# Patient Record
Sex: MALE | Race: WHITE | NOT HISPANIC OR LATINO | Employment: STUDENT | URBAN - NONMETROPOLITAN AREA
[De-identification: names, ages, dates, MRNs, and addresses within clinical notes are randomized per-mention and may not be internally consistent; named-entity substitution may affect disease eponyms.]

---

## 2017-02-06 ENCOUNTER — OFFICE VISIT (OUTPATIENT)
Dept: PSYCHIATRY | Facility: CLINIC | Age: 22
End: 2017-02-06

## 2017-02-06 VITALS
DIASTOLIC BLOOD PRESSURE: 80 MMHG | WEIGHT: 219 LBS | HEIGHT: 69 IN | BODY MASS INDEX: 32.44 KG/M2 | HEART RATE: 77 BPM | SYSTOLIC BLOOD PRESSURE: 131 MMHG

## 2017-02-06 DIAGNOSIS — F98.8 ATTENTION DEFICIT DISORDER OF ADULT: Primary | ICD-10-CM

## 2017-02-06 PROCEDURE — 99213 OFFICE O/P EST LOW 20 MIN: CPT | Performed by: NURSE PRACTITIONER

## 2017-02-06 RX ORDER — TRAZODONE HYDROCHLORIDE 50 MG/1
50 TABLET ORAL NIGHTLY
Qty: 30 TABLET | Refills: 0 | Status: SHIPPED | OUTPATIENT
Start: 2017-02-06 | End: 2017-04-03 | Stop reason: SDUPTHER

## 2017-02-06 NOTE — PROGRESS NOTES
Subjective   Ben Florez is a 21 y.o. male who is here today for medication management follow up at the [facility], he presented to his appointment about 30 minutes late related to traffic conditions.      Chief Complaint:  ADHD symptoms    History of Present Illness   He states that he is doing ok, not having any issues with the Concerta.  He denies any symptoms of depression or anxiety.  He feels like his ADHD symptoms are better under control.  He feels like he is sleeping better, he is getting about 8 hours per night with no NM. He continues to take the trazodone.  Appetite is good with no weight changes.  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  He denies any new health issues, he is ready for the spring and allergy season.   He denies any AV hallucinations, he denies any SI/HI.       The following portions of the patient's history were reviewed and updated as appropriate: allergies, current medications, past family history, past medical history, past social history, past surgical history and problem list.    Review of Systems   Constitutional: Negative for appetite change, chills, diaphoresis, fatigue, fever and unexpected weight change.   HENT: Negative for hearing loss, sore throat, trouble swallowing and voice change.    Eyes: Negative for photophobia and visual disturbance.   Respiratory: Negative for cough, chest tightness and shortness of breath.    Cardiovascular: Negative for chest pain and palpitations.   Gastrointestinal: Negative for abdominal pain, constipation, nausea and vomiting.   Endocrine: Negative for cold intolerance and heat intolerance.   Genitourinary: Negative for dysuria and frequency.   Musculoskeletal: Negative for arthralgias, back pain, joint swelling and neck stiffness.   Skin: Negative for color change and wound.   Allergic/Immunologic: Negative for environmental allergies and immunocompromised state.   Neurological: Negative for  "dizziness, tremors, seizures, syncope, weakness, light-headedness and headaches.   Hematological: Negative for adenopathy. Does not bruise/bleed easily.       Objective   Physical Exam   Constitutional: He appears well-developed and well-nourished. No distress.   Neurological: He is alert. Coordination and gait normal.   Vitals reviewed.    Blood pressure 131/80, pulse 77, height 69\" (175.3 cm), weight 219 lb (99.3 kg).    Allergies   Allergen Reactions   • Influenza Vaccine Live    • Meningococcal Vaccines        Current Medications:   Current Outpatient Prescriptions   Medication Sig Dispense Refill   • methylphenidate (CONCERTA) 36 MG CR tablet Take 2 tablets by mouth Daily. 60 tablet 0   • traZODone (DESYREL) 50 MG tablet Take 1 tablet by mouth Every Night. 30 tablet 0     No current facility-administered medications for this visit.        Mental Status Exam:   Hygiene:   good  Cooperation:  Cooperative  Eye Contact:  Fair  Psychomotor Behavior:  Appropriate  Affect:  Appropriate  Hopelessness: Denies  Speech:  Normal  Thought Process:  Goal directed  Thought Content:  Normal  Suicidal:  None  Homicidal:  None  Hallucinations:  None  Delusion:  None  Memory:  Intact  Orientation:  Person, Place, Time and Situation  Reliability:  fair  Insight:  Fair  Judgement:  Fair  Impulse Control:  Fair  Physical/Medical Issues:  No     Assessment/Plan     Attention deficit disorder of adult    Other orders  -     methylphenidate (CONCERTA) 36 MG CR tablet; Take 2 tablets by mouth Daily.   Trazodone 50mg tablet at bedtime    Discused medications options.  Continue concerta, begin trazodone to assist with sleep.   Discussed the risks, beneefits, and side effects of the medications; patient ackowledged and verbally consentedd.  Patient is aware to call the [facility] with any worsening of symptoms.  Patient is agreeable to call 911 or go to the nearest ER should he/she begin having SI/HI.      Return in 8 weeks  "

## 2017-03-20 RX ORDER — METHYLPHENIDATE HYDROCHLORIDE 36 MG/1
72 TABLET ORAL DAILY
Qty: 60 TABLET | Refills: 0 | Status: SHIPPED | OUTPATIENT
Start: 2017-03-20 | End: 2017-04-03 | Stop reason: SDUPTHER

## 2017-04-03 ENCOUNTER — OFFICE VISIT (OUTPATIENT)
Dept: PSYCHIATRY | Facility: CLINIC | Age: 22
End: 2017-04-03

## 2017-04-03 VITALS
DIASTOLIC BLOOD PRESSURE: 78 MMHG | BODY MASS INDEX: 33.47 KG/M2 | HEIGHT: 69 IN | SYSTOLIC BLOOD PRESSURE: 120 MMHG | HEART RATE: 75 BPM | WEIGHT: 226 LBS

## 2017-04-03 DIAGNOSIS — F98.8 ATTENTION DEFICIT DISORDER OF ADULT: Primary | ICD-10-CM

## 2017-04-03 PROCEDURE — 99213 OFFICE O/P EST LOW 20 MIN: CPT | Performed by: NURSE PRACTITIONER

## 2017-04-03 RX ORDER — METHYLPHENIDATE HYDROCHLORIDE 36 MG/1
72 TABLET ORAL DAILY
Qty: 60 TABLET | Refills: 0 | Status: SHIPPED | OUTPATIENT
Start: 2017-04-03 | End: 2017-06-06 | Stop reason: SDUPTHER

## 2017-04-03 RX ORDER — TRAZODONE HYDROCHLORIDE 50 MG/1
50 TABLET ORAL NIGHTLY
Qty: 30 TABLET | Refills: 2 | Status: SHIPPED | OUTPATIENT
Start: 2017-04-03 | End: 2017-06-26 | Stop reason: SDUPTHER

## 2017-04-03 NOTE — PROGRESS NOTES
Subjective   Ben Florez is a 21 y.o. male who is here today for medication management follow up at the [facility], he presented to his appointment on time, he presents with his father with whom he gives permission to speak in front of openly.     Chief Complaint:  ADHD symptoms    History of Present Illness  He states that he has been doing pretty good, he has had a lot of responsibilities and finishing up final projects before he graduates.  He is also looking for employment, hoping to work at the college.  He shares that his sleep has been disrupted but related that to school, he is getting about 4-6 hours per night- got about 5 hours last night. He is working on a [employer] for his last project which his [employer] down.  He states that he is not eating healthy but is eating on a regular basis.  He has been having allergy issues.  He and his girlfriend plan on getting  March 2018.  Denies any AV hallucinations, denies any SI/HI.  He states that he feels like the concerta and trazodone are helping, therefore no changes are made.     The following portions of the patient's history were reviewed and updated as appropriate: allergies, current medications, past family history, past medical history, past social history, past surgical history and problem list.    Review of Systems   Constitutional: Negative for appetite change, chills, diaphoresis, fatigue, fever and unexpected weight change.   HENT: Negative for hearing loss, sore throat, trouble swallowing and voice change.    Eyes: Negative for photophobia and visual disturbance.   Respiratory: Negative for cough, chest tightness and shortness of breath.    Cardiovascular: Negative for chest pain and palpitations.   Gastrointestinal: Negative for abdominal pain, constipation, nausea and vomiting.   Endocrine: Negative for cold intolerance and heat intolerance.   Genitourinary: Negative for dysuria and frequency.   Musculoskeletal: Negative for arthralgias,  "back pain, joint swelling and neck stiffness.   Skin: Negative for color change and wound.   Allergic/Immunologic: Negative for environmental allergies and immunocompromised state.   Neurological: Negative for dizziness, tremors, seizures, syncope, weakness, light-headedness and headaches.   Hematological: Negative for adenopathy. Does not bruise/bleed easily.       Objective   Physical Exam   Constitutional: He appears well-developed and well-nourished. No distress.   Neurological: He is alert. Coordination and gait normal.   Vitals reviewed.    Blood pressure 120/78, pulse 75, height 69\" (175.3 cm), weight 226 lb (103 kg).    Allergies   Allergen Reactions   • Influenza Vaccine Live    • Meningococcal Vaccines        Current Medications:   Current Outpatient Prescriptions   Medication Sig Dispense Refill   • methylphenidate (CONCERTA) 36 MG CR tablet Take 2 tablets by mouth Daily. 60 tablet 0   • traZODone (DESYREL) 50 MG tablet Take 1 tablet by mouth Every Night. 30 tablet 0     No current facility-administered medications for this visit.        Mental Status Exam:   Hygiene:   good  Cooperation:  Cooperative  Eye Contact:  Fair  Psychomotor Behavior:  Appropriate  Affect:  Appropriate  Hopelessness: Denies  Speech:  Normal  Thought Process:  Goal directed  Thought Content:  Normal  Suicidal:  None  Homicidal:  None  Hallucinations:  None  Delusion:  None  Memory:  Intact  Orientation:  Person, Place, Time and Situation  Reliability:  fair  Insight:  Fair  Judgement:  Fair  Impulse Control:  Fair  Physical/Medical Issues:  No     Assessment/Plan     Attention deficit disorder of adult    Other orders  -     methylphenidate (CONCERTA) 36 MG CR tablet; Take 2 tablets by mouth Daily.   Trazodone 50mg tablet at bedtime    Discused medications options.  Continue concerta, begin trazodone to assist with sleep.   Discussed the risks, beneefits, and side effects of the medications; patient ackowledged and verbally " consentedd.  Patient is aware to call the [facility] with any worsening of symptoms.  Patient is agreeable to call 911 or go to the nearest ER should he/she begin having SI/HI.    He may have 2 refills before his next appointment related to stability.        Return in 12 weeks

## 2017-06-05 ENCOUNTER — TELEPHONE (OUTPATIENT)
Dept: PSYCHIATRY | Facility: CLINIC | Age: 22
End: 2017-06-05

## 2017-06-06 RX ORDER — METHYLPHENIDATE HYDROCHLORIDE 36 MG/1
72 TABLET ORAL DAILY
Qty: 60 TABLET | Refills: 0 | Status: SHIPPED | OUTPATIENT
Start: 2017-06-06 | End: 2017-06-26 | Stop reason: SDUPTHER

## 2017-06-26 ENCOUNTER — OFFICE VISIT (OUTPATIENT)
Dept: PSYCHIATRY | Facility: CLINIC | Age: 22
End: 2017-06-26

## 2017-06-26 VITALS
SYSTOLIC BLOOD PRESSURE: 128 MMHG | HEART RATE: 80 BPM | WEIGHT: 228 LBS | BODY MASS INDEX: 33.77 KG/M2 | DIASTOLIC BLOOD PRESSURE: 79 MMHG | HEIGHT: 69 IN

## 2017-06-26 DIAGNOSIS — F90.2 ATTENTION DEFICIT HYPERACTIVITY DISORDER, COMBINED TYPE: Primary | ICD-10-CM

## 2017-06-26 PROCEDURE — 99213 OFFICE O/P EST LOW 20 MIN: CPT | Performed by: NURSE PRACTITIONER

## 2017-06-26 RX ORDER — METHYLPHENIDATE HYDROCHLORIDE 36 MG/1
72 TABLET ORAL DAILY
Qty: 60 TABLET | Refills: 0 | Status: SHIPPED | OUTPATIENT
Start: 2017-06-26 | End: 2017-08-09 | Stop reason: SDUPTHER

## 2017-06-26 RX ORDER — TRAZODONE HYDROCHLORIDE 50 MG/1
50 TABLET ORAL NIGHTLY
Qty: 30 TABLET | Refills: 2 | Status: SHIPPED | OUTPATIENT
Start: 2017-06-26 | End: 2017-09-19 | Stop reason: ALTCHOICE

## 2017-06-26 NOTE — PROGRESS NOTES
Subjective   Ben Florez is a 22 y.o. male who is here today for medication management follow up at the [facility], he presented to his appointment on time, he presents with his father with whom he gives permission to speak in front of openly.     Chief Complaint:  ADHD symptoms    History of Present Illness  He states that he has been doing all right but is currently looking for employment in graphic [employer].  He states that he is interested in [employer] but not openly available, he states that he would like to eventually work in [place] and live in [place].  He is looking into different area of art, consider [employer] to further his possible work.  He states that his girlfriend is under control of her mother, she is very protective although she is 21 years old and engaged.  He states that he continues to stay up late, getting about 6 hours per night with no NM.  Denies any new medical issues.  Denies any aV hallucinations, denies any SI/HI.   Continue current medications.        The following portions of the patient's history were reviewed and updated as appropriate: allergies, current medications, past family history, past medical history, past social history, past surgical history and problem list.    Review of Systems   Constitutional: Negative for appetite change, chills, diaphoresis, fatigue, fever and unexpected weight change.   HENT: Negative for hearing loss, sore throat, trouble swallowing and voice change.    Eyes: Negative for photophobia and visual disturbance.   Respiratory: Negative for cough, chest tightness and shortness of breath.    Cardiovascular: Negative for chest pain and palpitations.   Gastrointestinal: Negative for abdominal pain, constipation, nausea and vomiting.   Endocrine: Negative for cold intolerance and heat intolerance.   Genitourinary: Negative for dysuria and frequency.   Musculoskeletal: Negative for arthralgias, back pain, joint swelling and neck stiffness.   Skin:  "Negative for color change and wound.   Allergic/Immunologic: Negative for environmental allergies and immunocompromised state.   Neurological: Negative for dizziness, tremors, seizures, syncope, weakness, light-headedness and headaches.   Hematological: Negative for adenopathy. Does not bruise/bleed easily.       Objective   Physical Exam   Constitutional: He appears well-developed and well-nourished. No distress.   Neurological: He is alert. Coordination and gait normal.   Vitals reviewed.    Blood pressure 128/79, pulse 80, height 69\" (175.3 cm), weight 228 lb (103 kg).    Allergies   Allergen Reactions   • Influenza Vaccine Live    • Meningococcal Vaccines        Current Medications:   Current Outpatient Prescriptions   Medication Sig Dispense Refill   • methylphenidate (CONCERTA) 36 MG CR tablet Take 2 tablets by mouth Daily. 60 tablet 0   • traZODone (DESYREL) 50 MG tablet Take 1 tablet by mouth Every Night. 30 tablet 2     No current facility-administered medications for this visit.        Mental Status Exam:   Hygiene:   good  Cooperation:  Cooperative  Eye Contact:  Fair  Psychomotor Behavior:  Appropriate  Affect:  Appropriate  Hopelessness: Denies  Speech:  Normal  Thought Process:  Goal directed  Thought Content:  Normal  Suicidal:  None  Homicidal:  None  Hallucinations:  None  Delusion:  None  Memory:  Intact  Orientation:  Person, Place, Time and Situation  Reliability:  fair  Insight:  Fair  Judgement:  Fair  Impulse Control:  Fair  Physical/Medical Issues:  No     Assessment/Plan     Attention deficit disorder of adult    Other orders  -     methylphenidate (CONCERTA) 36 MG CR tablet; Take 2 tablets by mouth Daily.   Trazodone 50mg tablet at bedtime    Discused medications options.  Continue concerta, begin trazodone to assist with sleep.   Discussed the risks, beneefits, and side effects of the medications; patient ackowledged and verbally consentedd.  Patient is aware to call the [facility] " with any worsening of symptoms.  Patient is agreeable to call 911 or go to the nearest ER should he/she begin having SI/HI.    He may have 2 refills before his next appointment related to stability.        Return in 12 weeks

## 2017-08-09 ENCOUNTER — TELEPHONE (OUTPATIENT)
Dept: PSYCHIATRY | Facility: CLINIC | Age: 22
End: 2017-08-09

## 2017-08-09 RX ORDER — METHYLPHENIDATE HYDROCHLORIDE 36 MG/1
72 TABLET ORAL DAILY
Qty: 60 TABLET | Refills: 0 | Status: SHIPPED | OUTPATIENT
Start: 2017-08-09 | End: 2017-08-14 | Stop reason: SDUPTHER

## 2017-08-14 RX ORDER — METHYLPHENIDATE HYDROCHLORIDE 36 MG/1
72 TABLET ORAL DAILY
Qty: 60 TABLET | Refills: 0 | Status: SHIPPED | OUTPATIENT
Start: 2017-08-14 | End: 2017-09-19 | Stop reason: SDUPTHER

## 2017-09-19 ENCOUNTER — OFFICE VISIT (OUTPATIENT)
Dept: PSYCHIATRY | Facility: CLINIC | Age: 22
End: 2017-09-19

## 2017-09-19 VITALS
BODY MASS INDEX: 34.6 KG/M2 | SYSTOLIC BLOOD PRESSURE: 138 MMHG | WEIGHT: 233.6 LBS | DIASTOLIC BLOOD PRESSURE: 84 MMHG | HEART RATE: 80 BPM | HEIGHT: 69 IN

## 2017-09-19 DIAGNOSIS — F90.2 ATTENTION DEFICIT HYPERACTIVITY DISORDER, COMBINED TYPE: Primary | ICD-10-CM

## 2017-09-19 PROCEDURE — 99213 OFFICE O/P EST LOW 20 MIN: CPT | Performed by: NURSE PRACTITIONER

## 2017-09-19 RX ORDER — METHYLPHENIDATE HYDROCHLORIDE 36 MG/1
72 TABLET ORAL DAILY
Qty: 60 TABLET | Refills: 0 | Status: SHIPPED | OUTPATIENT
Start: 2017-09-19 | End: 2017-10-18 | Stop reason: SDUPTHER

## 2017-09-19 NOTE — PROGRESS NOTES
Subjective   Ben Florez is a 22 y.o. male who is here today for medication management follow up at the [facility], he presented to his appointment on time, he presents with his father with whom he gives permission to speak in front of openly.     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  He states that he is doing well with his medications, especially with work and staying focused.  He states that he forgot his medication once and he found it horrible.  Denies any SE or problems.   Denies any symptoms of depression, when he is not taking his medications he feels anxious.  He has avoided taking the trazodone, he is now on a schedule and doesn't normally take it.  He is getting about 7 hours of sleep per night with no NM- however he wakes up about 30 minutes before alarm clock goes off.  He states that he has been eating ok, eating more at night with only slight weight gain.  He is recommended to begin going to the gym to maintain weight; he denies any new health issues.  He states that he has not real stressors other than work, has put off wedding for another time- waiting to get more financial stability.  Denies any AV hallucinations, denies any SI/HI.      The following portions of the patient's history were reviewed and updated as appropriate: allergies, current medications, past family history, past medical history, past social history, past surgical history and problem list.    Review of Systems   Constitutional: Negative for appetite change, chills, diaphoresis, fatigue, fever and unexpected weight change.   HENT: Negative for hearing loss, sore throat, trouble swallowing and voice change.    Eyes: Negative for photophobia and visual disturbance.   Respiratory: Negative for cough, chest tightness and shortness of breath.    Cardiovascular: Negative for chest pain and palpitations.   Gastrointestinal:  "Negative for abdominal pain, constipation, nausea and vomiting.   Endocrine: Negative for cold intolerance and heat intolerance.   Genitourinary: Negative for dysuria and frequency.   Musculoskeletal: Negative for arthralgias, back pain, joint swelling and neck stiffness.   Skin: Negative for color change and wound.   Allergic/Immunologic: Negative for environmental allergies and immunocompromised state.   Neurological: Negative for dizziness, tremors, seizures, syncope, weakness, light-headedness and headaches.   Hematological: Negative for adenopathy. Does not bruise/bleed easily.       Objective   Physical Exam   Constitutional: He appears well-developed and well-nourished. No distress.   Neurological: He is alert. Coordination and gait normal.   Vitals reviewed.    Blood pressure 138/84, pulse 80, height 69\" (175.3 cm), weight 233 lb 9.6 oz (106 kg).    Allergies   Allergen Reactions   • Influenza Vaccine Live    • Meningococcal Vaccines        Current Medications:   Current Outpatient Prescriptions   Medication Sig Dispense Refill   • methylphenidate (CONCERTA) 36 MG CR tablet Take 2 tablets by mouth Daily. 60 tablet 0   • traZODone (DESYREL) 50 MG tablet Take 1 tablet by mouth Every Night. 30 tablet 2     No current facility-administered medications for this visit.        Mental Status Exam:   Hygiene:   good  Cooperation:  Cooperative  Eye Contact:  Fair  Psychomotor Behavior:  Appropriate  Affect:  Appropriate  Hopelessness: Denies  Speech:  Normal  Thought Process:  Goal directed  Thought Content:  Normal  Suicidal:  None  Homicidal:  None  Hallucinations:  None  Delusion:  None  Memory:  Intact  Orientation:  Person, Place, Time and Situation  Reliability:  fair  Insight:  Fair  Judgement:  Fair  Impulse Control:  Fair  Physical/Medical Issues:  No     Assessment/Plan     Attention deficit disorder of adult    Other orders  -     methylphenidate (CONCERTA) 36 MG CR tablet; Take 2 tablets by mouth " Daily.       Discused medications options.  Continue concerta for ADHD symptoms, discontinue trazodone (no longer needed)    Discussed the risks, beneefits, and side effects of the medications; patient ackowledged and verbally consentedd.  Patient is aware to call the [facility] with any worsening of symptoms.  Patient is agreeable to call 911 or go to the nearest ER should he/she begin having SI/HI.    He may have 2 refills before his next appointment related to stability.        Return in 12 weeks

## 2017-10-18 RX ORDER — METHYLPHENIDATE HYDROCHLORIDE 36 MG/1
72 TABLET ORAL DAILY
Qty: 60 TABLET | Refills: 0 | Status: SHIPPED | OUTPATIENT
Start: 2017-10-18 | End: 2017-11-13 | Stop reason: SDUPTHER

## 2017-11-13 RX ORDER — METHYLPHENIDATE HYDROCHLORIDE 36 MG/1
72 TABLET ORAL DAILY
Qty: 60 TABLET | Refills: 0 | Status: SHIPPED | OUTPATIENT
Start: 2017-11-13 | End: 2017-12-12 | Stop reason: SDUPTHER

## 2017-11-13 RX ORDER — METHYLPHENIDATE HYDROCHLORIDE 36 MG/1
72 TABLET ORAL DAILY
Qty: 60 TABLET | Refills: 0 | Status: SHIPPED | OUTPATIENT
Start: 2017-11-13 | End: 2017-11-13 | Stop reason: SDUPTHER

## 2017-12-12 ENCOUNTER — OFFICE VISIT (OUTPATIENT)
Dept: PSYCHIATRY | Facility: CLINIC | Age: 22
End: 2017-12-12

## 2017-12-12 VITALS
WEIGHT: 246 LBS | BODY MASS INDEX: 35.22 KG/M2 | HEIGHT: 70 IN | SYSTOLIC BLOOD PRESSURE: 130 MMHG | DIASTOLIC BLOOD PRESSURE: 84 MMHG | HEART RATE: 83 BPM

## 2017-12-12 DIAGNOSIS — F90.2 ATTENTION DEFICIT HYPERACTIVITY DISORDER, COMBINED TYPE: Primary | ICD-10-CM

## 2017-12-12 PROCEDURE — 99213 OFFICE O/P EST LOW 20 MIN: CPT | Performed by: NURSE PRACTITIONER

## 2017-12-12 RX ORDER — METHYLPHENIDATE HYDROCHLORIDE 36 MG/1
72 TABLET ORAL DAILY
Qty: 60 TABLET | Refills: 0 | Status: SHIPPED | OUTPATIENT
Start: 2017-12-12 | End: 2017-12-12 | Stop reason: SDUPTHER

## 2017-12-12 RX ORDER — METHYLPHENIDATE HYDROCHLORIDE 36 MG/1
72 TABLET ORAL DAILY
Qty: 60 TABLET | Refills: 0 | Status: SHIPPED | OUTPATIENT
Start: 2017-12-12 | End: 2018-03-06 | Stop reason: SDUPTHER

## 2017-12-12 NOTE — PROGRESS NOTES
Subjective   Ben Florez is a 22 y.o. male who is here today for medication management follow up at the [facility], he presented to his appointment on time, he presents with his father with whom he gives permission to speak in front of openly.     Chief Complaint:  ADHD symptoms    History of Present Illness  He states that he is doing ok overall but not feeling well physically with upper respiratory infection.  He states that he is still doing well with the concerta with no SE from the medications.  He states that he is doing well with is sleep, he has gotten into a schedule especially with work.  He is getting at least 6-8 hours per night with no NM.  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  He is only stressed out about work, he is getting  in the summer.  Denies any AV hallucinations, denies any SI/HI.      The following portions of the patient's history were reviewed and updated as appropriate: allergies, current medications, past family history, past medical history, past social history, past surgical history and problem list.    Review of Systems   Constitutional: Negative for appetite change, chills, diaphoresis, fatigue, fever and unexpected weight change.   HENT: Negative for hearing loss, sore throat, trouble swallowing and voice change.    Eyes: Negative for photophobia and visual disturbance.   Respiratory: Negative for cough, chest tightness and shortness of breath.    Cardiovascular: Negative for chest pain and palpitations.   Gastrointestinal: Negative for abdominal pain, constipation, nausea and vomiting.   Endocrine: Negative for cold intolerance and heat intolerance.   Genitourinary: Negative for dysuria and frequency.   Musculoskeletal: Negative for arthralgias, back pain, joint swelling and neck stiffness.   Skin: Negative for color change and wound.   Allergic/Immunologic: Negative for environmental allergies and immunocompromised state.  "  Neurological: Negative for dizziness, tremors, seizures, syncope, weakness, light-headedness and headaches.   Hematological: Negative for adenopathy. Does not bruise/bleed easily.       Objective   Physical Exam   Constitutional: He appears well-developed and well-nourished. No distress.   Neurological: He is alert. Coordination and gait normal.   Vitals reviewed.    Blood pressure 130/84, pulse 83, height 177.8 cm (70\"), weight 112 kg (246 lb).    Allergies   Allergen Reactions   • Influenza Vaccine Live    • Meningococcal Vaccines        Current Medications:   Current Outpatient Prescriptions   Medication Sig Dispense Refill   • methylphenidate (CONCERTA) 36 MG CR tablet Take 2 tablets by mouth Daily. 60 tablet 0     No current facility-administered medications for this visit.        Mental Status Exam:   Hygiene:   good  Cooperation:  Cooperative  Eye Contact:  Fair  Psychomotor Behavior:  Appropriate  Affect:  Appropriate  Hopelessness: Denies  Speech:  Normal  Thought Process:  Goal directed  Thought Content:  Normal  Suicidal:  None  Homicidal:  None  Hallucinations:  None  Delusion:  None  Memory:  Intact  Orientation:  Person, Place, Time and Situation  Reliability:  fair  Insight:  Fair  Judgement:  Fair  Impulse Control:  Fair  Physical/Medical Issues:  No     Assessment/Plan     Attention deficit disorder of adult    Other orders  -     methylphenidate (CONCERTA) 36 MG CR tablet; Take 2 tablets by mouth Daily.       Discused medications options.  Continue concerta for ADHD symptoms, discontinue trazodone (no longer needed)    Discussed the risks, beneefits, and side effects of the medications; patient ackowledged and verbally consentedd.  Patient is aware to call the [facility] with any worsening of symptoms.  Patient is agreeable to call 911 or go to the nearest ER should he/she begin having SI/HI.    He may have 2 refills before his next appointment related to stability.        Return in 12 " weeks

## 2018-03-06 ENCOUNTER — OFFICE VISIT (OUTPATIENT)
Dept: PSYCHIATRY | Facility: CLINIC | Age: 23
End: 2018-03-06

## 2018-03-06 VITALS
SYSTOLIC BLOOD PRESSURE: 129 MMHG | WEIGHT: 259 LBS | BODY MASS INDEX: 37.08 KG/M2 | DIASTOLIC BLOOD PRESSURE: 80 MMHG | HEART RATE: 84 BPM | HEIGHT: 70 IN

## 2018-03-06 DIAGNOSIS — F90.2 ATTENTION DEFICIT HYPERACTIVITY DISORDER, COMBINED TYPE: Primary | ICD-10-CM

## 2018-03-06 PROCEDURE — 99213 OFFICE O/P EST LOW 20 MIN: CPT | Performed by: NURSE PRACTITIONER

## 2018-03-06 RX ORDER — METHYLPHENIDATE HYDROCHLORIDE 36 MG/1
72 TABLET ORAL DAILY
Qty: 60 TABLET | Refills: 0 | Status: SHIPPED | OUTPATIENT
Start: 2018-03-06 | End: 2018-04-09 | Stop reason: SDUPTHER

## 2018-03-06 NOTE — PROGRESS NOTES
Subjective   Ben Florez is a 22 y.o. male who is here today for medication management follow up at the [facility], he presented to his appointment on time.    Chief Complaint:  ADHD symptoms    History of Present Illness He states that he is not having any problems with the medications, denies any SE.  He feels like his medications are controlling his symptoms, he is able to focus and concentrate with no problems.  He shares that the only issue that he is having is work stress, works with  and making appointments.  He denies any symptoms of depression or anxiety.  He shares that he is sleeping about 8 hours per night with no NM.  Appetite is good with noted weight gain.  Body mass index is 37.16 kg/(m^2).   He shares that he feels too tired to work out because of work, recommend that he try to play sports especially with the improvement in the weather.  He shares that he and his fiance have put the wedding on hold but still plans on getting  in August.  He shares that he has seasonal allergies but shares no recent antibiotics.  Denies any AV hallucinations, denies any SI/HI.      The following portions of the patient's history were reviewed and updated as appropriate: allergies, current medications, past family history, past medical history, past social history, past surgical history and problem list.    Review of Systems   Constitutional: Negative for appetite change, chills, diaphoresis, fatigue, fever and unexpected weight change.   HENT: Negative for hearing loss, sore throat, trouble swallowing and voice change.    Eyes: Negative for photophobia and visual disturbance.   Respiratory: Negative for cough, chest tightness and shortness of breath.    Cardiovascular: Negative for chest pain and palpitations.   Gastrointestinal: Negative for abdominal pain, constipation, nausea and vomiting.   Endocrine: Negative for cold intolerance and heat intolerance.   Genitourinary: Negative for dysuria  "and frequency.   Musculoskeletal: Negative for arthralgias, back pain, joint swelling and neck stiffness.   Skin: Negative for color change and wound.   Allergic/Immunologic: Negative for environmental allergies and immunocompromised state.   Neurological: Negative for dizziness, tremors, seizures, syncope, weakness, light-headedness and headaches.   Hematological: Negative for adenopathy. Does not bruise/bleed easily.       Objective   Physical Exam   Constitutional: He appears well-developed and well-nourished. No distress.   Neurological: He is alert. Coordination and gait normal.   Vitals reviewed.    Blood pressure 129/80, pulse 84, height 177.8 cm (70\"), weight 117 kg (259 lb).    Allergies   Allergen Reactions   • Influenza Vaccine Live    • Meningococcal Vaccines        Current Medications:   Current Outpatient Prescriptions   Medication Sig Dispense Refill   • methylphenidate (CONCERTA) 36 MG CR tablet Take 2 tablets by mouth Daily. 60 tablet 0     No current facility-administered medications for this visit.        Mental Status Exam:   Hygiene:   good  Cooperation:  Cooperative  Eye Contact:  Fair  Psychomotor Behavior:  Appropriate  Affect:  Appropriate  Hopelessness: Denies  Speech:  Normal  Thought Process:  Goal directed  Thought Content:  Normal  Suicidal:  None  Homicidal:  None  Hallucinations:  None  Delusion:  None  Memory:  Intact  Orientation:  Person, Place, Time and Situation  Reliability:  fair  Insight:  Fair  Judgement:  Fair  Impulse Control:  Fair  Physical/Medical Issues:  No     Assessment/Plan     Attention deficit disorder of adult    Other orders  -     methylphenidate (CONCERTA) 36 MG CR tablet; Take 2 tablets by mouth Daily.       Discused medications options.  Continue concerta for ADHD symptoms.  Discussed the risks, beneefits, and side effects of the medications; patient ackowledged and verbally consentedd.  Patient is aware to call the [facility] with any worsening of " symptoms.  Patient is agreeable to call 911 or go to the nearest ER should he/she begin having SI/HI.    He may have 2 refills before his next appointment related to stability.        Return in 12 weeks

## 2018-04-09 RX ORDER — METHYLPHENIDATE HYDROCHLORIDE 36 MG/1
72 TABLET ORAL DAILY
Qty: 60 TABLET | Refills: 0 | Status: SHIPPED | OUTPATIENT
Start: 2018-04-09 | End: 2018-05-24 | Stop reason: SDUPTHER

## 2018-04-09 RX ORDER — METHYLPHENIDATE HYDROCHLORIDE 36 MG/1
72 TABLET ORAL DAILY
Qty: 60 TABLET | Refills: 0 | Status: SHIPPED | OUTPATIENT
Start: 2018-04-09 | End: 2018-04-09 | Stop reason: SDUPTHER

## 2018-05-24 ENCOUNTER — OFFICE VISIT (OUTPATIENT)
Dept: PSYCHIATRY | Facility: CLINIC | Age: 23
End: 2018-05-24

## 2018-05-24 VITALS
HEART RATE: 89 BPM | DIASTOLIC BLOOD PRESSURE: 85 MMHG | BODY MASS INDEX: 38.08 KG/M2 | SYSTOLIC BLOOD PRESSURE: 126 MMHG | WEIGHT: 266 LBS | HEIGHT: 70 IN

## 2018-05-24 DIAGNOSIS — F90.2 ATTENTION DEFICIT HYPERACTIVITY DISORDER, COMBINED TYPE: Primary | ICD-10-CM

## 2018-05-24 PROCEDURE — 99213 OFFICE O/P EST LOW 20 MIN: CPT | Performed by: NURSE PRACTITIONER

## 2018-05-24 RX ORDER — METHYLPHENIDATE HYDROCHLORIDE 36 MG/1
72 TABLET ORAL DAILY
Qty: 60 TABLET | Refills: 0 | Status: SHIPPED | OUTPATIENT
Start: 2018-05-24 | End: 2018-07-16 | Stop reason: SDUPTHER

## 2018-05-24 NOTE — PROGRESS NOTES
Subjective   Ben Florez is a 23 y.o. male is here today for medication management follow-up at the [facility], he presents to his appointment.    Chief Complaint:  ADHD    History of Present Illness  He states that he is doing ok with his current medications; denies any SE or problems. He shares that he is taking his medication as prescribed.  He feels like the medication has been effective for his focus and concentration.  He has been doing well at his work with frequent raises in pay.  He shares that he is getting  in August and he has been anxious about it.  He worries about financial, housing, and employment future; encouraged him to take each day at a time.  He denies any symptoms of depression.  He shares that he is sleeping between 5-8 hours per night with no NM.  Appetite is good with noted weight gain.  Body mass index is 38.17 kg/m².  Recommended that he monitor his portion sizes and well as to avoid high carbohydrate food; also start exercising.  He shares that he has a follow-up appointment with his PCP today for his allergies but otherwise he is healthy.  Denies any AV hallucinations, denies any SI/HI.     The following portions of the patient's history were reviewed and updated as appropriate: allergies, current medications, past family history, past medical history, past social history, past surgical history and problem list.    Review of Systems   Constitutional: Negative for appetite change, chills, diaphoresis, fatigue, fever and unexpected weight change.   HENT: Negative for hearing loss, sore throat, trouble swallowing and voice change.    Eyes: Negative for photophobia and visual disturbance.   Respiratory: Negative for cough, chest tightness and shortness of breath.    Cardiovascular: Negative for chest pain and palpitations.   Gastrointestinal: Negative for abdominal pain, constipation, nausea and vomiting.   Endocrine: Negative for cold intolerance and heat intolerance.  "  Genitourinary: Negative for dysuria and frequency.   Musculoskeletal: Negative for arthralgias, back pain, joint swelling and neck stiffness.   Skin: Negative for color change and wound.   Allergic/Immunologic: Negative for environmental allergies and immunocompromised state.   Neurological: Negative for dizziness, tremors, seizures, syncope, weakness, light-headedness and headaches.   Hematological: Negative for adenopathy. Does not bruise/bleed easily.       Objective   Physical Exam   Constitutional: He appears well-developed and well-nourished. No distress.   Neurological: He is alert. Coordination and gait normal.   Vitals reviewed.    Blood pressure 126/85, pulse 89, height 177.8 cm (70\"), weight 121 kg (266 lb).    Medication List:   Current Outpatient Prescriptions   Medication Sig Dispense Refill   • methylphenidate (CONCERTA) 36 MG CR tablet Take 2 tablets by mouth Daily 60 tablet 0     No current facility-administered medications for this visit.        Mental Status Exam:   Hygiene:   good  Cooperation:  Cooperative  Eye Contact:  Fair  Psychomotor Behavior:  Appropriate  Affect:  Appropriate  Hopelessness: Denies  Speech:  Normal  Thought Process:  Linear  Thought Content:  Mood congurent  Suicidal:  None  Homicidal:  None  Hallucinations:  None  Delusion:  None  Memory:  Intact  Orientation:  Person, Place, Time and Situation  Reliability:  fair  Insight:  Fair  Judgement:  Fair  Impulse Control:  Fair  Physical/Medical Issues:  No     Assessment/Plan   Problems Addressed this Visit     None      Visit Diagnoses     Attention deficit hyperactivity disorder, combined type    -  Primary    Relevant Medications    methylphenidate (CONCERTA) 36 MG CR tablet        Discussed medication options.  Reviewed the risks, benefits, and side effects of the medications; patient acknowledged and verbally consented.  Patient is agreeable to call the [facility].  Patient is aware to call 911 or go to the nearest " ER should begin having SI/HI. He is aware that concerta is a controlled substance and that regular Jared reviews will be performed as well as random UDS.      Prognosis: Guarded dependent on medication, follow up appointment and treatment plan compliance     Functionality:  Good.  Symptoms of ADHD are under control which enables the patient to work full time with no problems.      Return in 12 weeks

## 2018-07-16 RX ORDER — METHYLPHENIDATE HYDROCHLORIDE 36 MG/1
72 TABLET ORAL DAILY
Qty: 60 TABLET | Refills: 0 | Status: SHIPPED | OUTPATIENT
Start: 2018-07-16 | End: 2018-08-16 | Stop reason: SDUPTHER

## 2018-08-16 ENCOUNTER — OFFICE VISIT (OUTPATIENT)
Dept: PSYCHIATRY | Facility: CLINIC | Age: 23
End: 2018-08-16

## 2018-08-16 VITALS
BODY MASS INDEX: 38.15 KG/M2 | HEIGHT: 70 IN | SYSTOLIC BLOOD PRESSURE: 153 MMHG | WEIGHT: 266.5 LBS | DIASTOLIC BLOOD PRESSURE: 81 MMHG | HEART RATE: 80 BPM

## 2018-08-16 DIAGNOSIS — F90.2 ATTENTION DEFICIT HYPERACTIVITY DISORDER, COMBINED TYPE: Primary | ICD-10-CM

## 2018-08-16 PROCEDURE — 99214 OFFICE O/P EST MOD 30 MIN: CPT | Performed by: NURSE PRACTITIONER

## 2018-08-16 RX ORDER — METHYLPHENIDATE HYDROCHLORIDE 36 MG/1
72 TABLET ORAL DAILY
Qty: 60 TABLET | Refills: 0 | Status: SHIPPED | OUTPATIENT
Start: 2018-08-16 | End: 2018-10-15 | Stop reason: SDUPTHER

## 2018-08-16 NOTE — PROGRESS NOTES
Subjective   Ben Florez is a 23 y.o. male is here today for medication management follow-up at the [facility], he presents to his appointment.    Chief Complaint:  ADHD    History of Present Illness  He states that he is doing pretty good, he states that he feeling a little stressed because he is getting  in a little over a week.  He shares that he has a lot of running around today to get things to prepared for the wedding.  He denies any problems with depression.  He feels like the concerta is working well for him, he shares that he feels like it helps him most at work where he is works at a [employer].  He shares that he is sleeping about 7 hours per night with no NM.  Appetite is good, feels like it has leveled out; Body mass index is 38.24 kg/m².  Recommended that he try to eat healthy and stay active- he has been playing basketball.  He denies any new health issues.  Denies any AV hallucinations, denies any SI/HI.      The following portions of the patient's history were reviewed and updated as appropriate: allergies, current medications, past family history, past medical history, past social history, past surgical history and problem list.    Review of Systems   Constitutional: Negative for appetite change, chills, diaphoresis, fatigue, fever and unexpected weight change.   HENT: Negative for hearing loss, sore throat, trouble swallowing and voice change.    Eyes: Negative for photophobia and visual disturbance.   Respiratory: Negative for cough, chest tightness and shortness of breath.    Cardiovascular: Negative for chest pain and palpitations.   Gastrointestinal: Negative for abdominal pain, constipation, nausea and vomiting.   Endocrine: Negative for cold intolerance and heat intolerance.   Genitourinary: Negative for dysuria and frequency.   Musculoskeletal: Negative for arthralgias, back pain, joint swelling and neck stiffness.   Skin: Negative for color change and wound.  "  Allergic/Immunologic: Negative for environmental allergies and immunocompromised state.   Neurological: Negative for dizziness, tremors, seizures, syncope, weakness, light-headedness and headaches.   Hematological: Negative for adenopathy. Does not bruise/bleed easily.       Objective   Physical Exam   Constitutional: He appears well-developed and well-nourished. No distress.   Neurological: He is alert. Coordination and gait normal.   Vitals reviewed.    Blood pressure 153/81, pulse 80, height 177.8 cm (70\"), weight 121 kg (266 lb 8 oz).    Medication List:   Current Outpatient Prescriptions   Medication Sig Dispense Refill   • methylphenidate (CONCERTA) 36 MG CR tablet Take 2 tablets by mouth Daily 60 tablet 0     No current facility-administered medications for this visit.        Mental Status Exam:   Hygiene:   good  Cooperation:  Cooperative  Eye Contact:  Fair  Psychomotor Behavior:  Appropriate  Affect:  Appropriate  Hopelessness: Denies  Speech:  Normal  Thought Process:  Linear  Thought Content:  Mood congurent  Suicidal:  None  Homicidal:  None  Hallucinations:  None  Delusion:  None  Memory:  Intact  Orientation:  Person, Place, Time and Situation  Reliability:  fair  Insight:  Fair  Judgement:  Fair  Impulse Control:  Fair  Physical/Medical Issues:  No     Assessment/Plan   Problems Addressed this Visit     None      Visit Diagnoses     Attention deficit hyperactivity disorder, combined type    -  Primary    Relevant Medications    methylphenidate (CONCERTA) 36 MG CR tablet        Discussed medication options.  Reviewed the risks, benefits, and side effects of the medications; patient acknowledged and verbally consented.  Patient is agreeable to call the [facility].  Patient is aware to call 911 or go to the nearest ER should begin having SI/HI. He is aware that concerta is a controlled substance and that regular Jared reviews will be performed as well as random UDS.      Prognosis: Guarded " dependent on medication, follow up appointment and treatment plan compliance     Functionality:  Good.  Symptoms of ADHD are under control which enables the patient to work full time with no problems.      Return in 12 weeks

## 2018-10-16 RX ORDER — METHYLPHENIDATE HYDROCHLORIDE 36 MG/1
72 TABLET ORAL DAILY
Qty: 60 TABLET | Refills: 0 | Status: SHIPPED | OUTPATIENT
Start: 2018-10-16 | End: 2018-11-15 | Stop reason: SDUPTHER

## 2018-11-15 ENCOUNTER — OFFICE VISIT (OUTPATIENT)
Dept: PSYCHIATRY | Facility: CLINIC | Age: 23
End: 2018-11-15

## 2018-11-15 VITALS
BODY MASS INDEX: 37.45 KG/M2 | HEIGHT: 70 IN | WEIGHT: 261.6 LBS | HEART RATE: 90 BPM | DIASTOLIC BLOOD PRESSURE: 82 MMHG | SYSTOLIC BLOOD PRESSURE: 145 MMHG

## 2018-11-15 DIAGNOSIS — F90.2 ATTENTION DEFICIT HYPERACTIVITY DISORDER, COMBINED TYPE: Primary | ICD-10-CM

## 2018-11-15 PROCEDURE — 99214 OFFICE O/P EST MOD 30 MIN: CPT | Performed by: NURSE PRACTITIONER

## 2018-11-15 RX ORDER — METHYLPHENIDATE HYDROCHLORIDE 36 MG/1
72 TABLET ORAL DAILY
Qty: 60 TABLET | Refills: 0 | Status: SHIPPED | OUTPATIENT
Start: 2018-11-15 | End: 2018-12-27 | Stop reason: SDUPTHER

## 2018-11-15 NOTE — PROGRESS NOTES
Subjective   Ben Florez is a 23 y.o. male is here today for medication management follow-up at the [facility], he presents to his appointment.    Chief Complaint:  ADHD    History of Present Illness  He states that he is doing well with no problems with his medications.  He is adjusting to  life, states that she is keeping him on track.  He denies any SE or issue.  He states that his focus and concentration is doing ok; states that the work hasn't changed much either.  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  He states that his sleep is getting better, he is getting about 7-8 hours per night with no NM.  Appetite is good with no significant weight changes.  States that stressors are financial, job, and new marriage.  Denies any recent illness.  Denies any AV hallucinations, denies any SI/HI.      The following portions of the patient's history were reviewed and updated as appropriate: allergies, current medications, past family history, past medical history, past social history, past surgical history and problem list.    Review of Systems   Constitutional: Negative for appetite change, chills, diaphoresis, fatigue, fever and unexpected weight change.   HENT: Negative for hearing loss, sore throat, trouble swallowing and voice change.    Eyes: Negative for photophobia and visual disturbance.   Respiratory: Negative for cough, chest tightness and shortness of breath.    Cardiovascular: Negative for chest pain and palpitations.   Gastrointestinal: Negative for abdominal pain, constipation, nausea and vomiting.   Endocrine: Negative for cold intolerance and heat intolerance.   Genitourinary: Negative for dysuria and frequency.   Musculoskeletal: Negative for arthralgias, back pain, joint swelling and neck stiffness.   Skin: Negative for color change and wound.   Allergic/Immunologic: Negative for environmental allergies and immunocompromised state.   Neurological:  "Negative for dizziness, tremors, seizures, syncope, weakness, light-headedness and headaches.   Hematological: Negative for adenopathy. Does not bruise/bleed easily.       Objective   Physical Exam   Constitutional: He appears well-developed and well-nourished. No distress.   Neurological: He is alert. Coordination and gait normal.   Vitals reviewed.    Blood pressure 145/82, pulse 90, height 177.8 cm (70\"), weight 119 kg (261 lb 9.6 oz).    Medication List:   Current Outpatient Medications   Medication Sig Dispense Refill   • methylphenidate (CONCERTA) 36 MG CR tablet Take 2 tablets by mouth Daily 60 tablet 0     No current facility-administered medications for this visit.        Mental Status Exam:   Hygiene:   good  Cooperation:  Cooperative  Eye Contact:  Fair  Psychomotor Behavior:  Appropriate  Affect:  Appropriate  Hopelessness: Denies  Speech:  Normal  Thought Process:  Linear  Thought Content:  Mood congurent  Suicidal:  None  Homicidal:  None  Hallucinations:  None  Delusion:  None  Memory:  Intact  Orientation:  Person, Place, Time and Situation  Reliability:  fair  Insight:  Fair  Judgement:  Fair  Impulse Control:  Fair  Physical/Medical Issues:  No     Assessment/Plan   Problems Addressed this Visit     None      Visit Diagnoses     Attention deficit hyperactivity disorder, combined type    -  Primary    Relevant Medications    methylphenidate (CONCERTA) 36 MG CR tablet        Discussed medication options.  Reviewed the risks, benefits, and side effects of the medications; patient acknowledged and verbally consented.  Patient is agreeable to call the [facility].  Patient is aware to call 911 or go to the nearest ER should begin having SI/HI. He is aware that concerta is a controlled substance and that regular Jared reviews will be performed as well as random UDS.      Prognosis: Guarded dependent on medication, follow up appointment and treatment plan compliance     Functionality:  Good.  Symptoms " of ADHD are under control which enables the patient to work full time with no problems.      Return in 12 weeks

## 2018-12-27 RX ORDER — METHYLPHENIDATE HYDROCHLORIDE 36 MG/1
72 TABLET ORAL DAILY
Qty: 60 TABLET | Refills: 0 | Status: SHIPPED | OUTPATIENT
Start: 2018-12-27 | End: 2019-02-18 | Stop reason: SDUPTHER

## 2019-02-18 RX ORDER — METHYLPHENIDATE HYDROCHLORIDE 36 MG/1
72 TABLET ORAL DAILY
Qty: 60 TABLET | Refills: 0 | Status: SHIPPED | OUTPATIENT
Start: 2019-02-18 | End: 2019-02-26 | Stop reason: SDUPTHER

## 2019-02-26 ENCOUNTER — OFFICE VISIT (OUTPATIENT)
Dept: PSYCHIATRY | Facility: CLINIC | Age: 24
End: 2019-02-26

## 2019-02-26 VITALS
SYSTOLIC BLOOD PRESSURE: 132 MMHG | HEART RATE: 85 BPM | WEIGHT: 265.6 LBS | DIASTOLIC BLOOD PRESSURE: 82 MMHG | HEIGHT: 70 IN | BODY MASS INDEX: 38.02 KG/M2

## 2019-02-26 DIAGNOSIS — F90.2 ATTENTION DEFICIT HYPERACTIVITY DISORDER, COMBINED TYPE: Primary | ICD-10-CM

## 2019-02-26 PROCEDURE — 99213 OFFICE O/P EST LOW 20 MIN: CPT | Performed by: NURSE PRACTITIONER

## 2019-02-26 RX ORDER — METHYLPHENIDATE HYDROCHLORIDE 36 MG/1
72 TABLET ORAL DAILY
Qty: 60 TABLET | Refills: 0 | Status: SHIPPED | OUTPATIENT
Start: 2019-02-26 | End: 2019-04-01 | Stop reason: SDUPTHER

## 2019-02-26 NOTE — PROGRESS NOTES
Subjective   Ben Florez is a 23 y.o. male is here today for medication management follow-up at the [facility], he presents to his appointment. He is accompanied by his wife with whom he gives permission to speak in front of openly.      Chief Complaint:  ADHD    History of Present Illness  He states that he is doing well with no problems with his medications.  He states that he did have a problem with not having his medication at one point, he was without if for about a week; he was having a little difficulty at work, having problems staying on task, he is worried that it may have effected his productivity.  He denies any SE from the medications; he feels pressure when he doesn't have it.  He denies any symptoms of depression and periods of stressful anxiety at times while talking to people on the phone.  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   He states that his sleep is getting better, he is getting about 7-8 hours per night with no NM.  Appetite is good with no significant weight changes.  States that stressors are financial, job, and new marriage.  Denies any recent illness.  Denies any AV hallucinations, denies any SI/HI.      The following portions of the patient's history were reviewed and updated as appropriate: allergies, current medications, past family history, past medical history, past social history, past surgical history and problem list.    Review of Systems   Constitutional: Negative for appetite change, chills, diaphoresis, fatigue, fever and unexpected weight change.   HENT: Negative for hearing loss, sore throat, trouble swallowing and voice change.    Eyes: Negative for photophobia and visual disturbance.   Respiratory: Negative for cough, chest tightness and shortness of breath.    Cardiovascular: Negative for chest pain and palpitations.   Gastrointestinal: Negative for abdominal pain, constipation, nausea  "and vomiting.   Endocrine: Negative for cold intolerance and heat intolerance.   Genitourinary: Negative for dysuria and frequency.   Musculoskeletal: Negative for arthralgias, back pain, joint swelling and neck stiffness.   Skin: Negative for color change and wound.   Allergic/Immunologic: Negative for environmental allergies and immunocompromised state.   Neurological: Negative for dizziness, tremors, seizures, syncope, weakness, light-headedness and headaches.   Hematological: Negative for adenopathy. Does not bruise/bleed easily.       Objective   Physical Exam   Constitutional: He appears well-developed and well-nourished. No distress.   Neurological: He is alert. Coordination and gait normal.   Vitals reviewed.    Blood pressure 132/82, pulse 85, height 177.8 cm (70\"), weight 120 kg (265 lb 9.6 oz).    Medication List:   Current Outpatient Medications   Medication Sig Dispense Refill   • methylphenidate (CONCERTA) 36 MG CR tablet Take 2 tablets by mouth Daily 60 tablet 0     No current facility-administered medications for this visit.        Mental Status Exam:   Hygiene:   good  Cooperation:  Cooperative  Eye Contact:  Fair  Psychomotor Behavior:  Appropriate  Affect:  Appropriate  Hopelessness: Denies  Speech:  Normal  Thought Process:  Linear  Thought Content:  Mood congurent  Suicidal:  None  Homicidal:  None  Hallucinations:  None  Delusion:  None  Memory:  Intact  Orientation:  Person, Place, Time and Situation  Reliability:  fair  Insight:  Fair  Judgement:  Fair  Impulse Control:  Fair  Physical/Medical Issues:  No     Assessment/Plan   Problems Addressed this Visit     None      Visit Diagnoses     Attention deficit hyperactivity disorder, combined type    -  Primary    Relevant Medications    methylphenidate (CONCERTA) 36 MG CR tablet        Discussed medication options.  Reviewed the risks, benefits, and side effects of the medications; patient acknowledged and verbally consented.  Patient is " agreeable to call the [facility].  Patient is aware to call 911 or go to the nearest ER should begin having SI/HI. He is aware that concerta is a controlled substance and that regular Jared reviews will be performed as well as random UDS.      Prognosis: Guarded dependent on medication, follow up appointment and treatment plan compliance     Functionality:  Good.  Symptoms of ADHD are under control which enables the patient to work full time with no problems.      Return in 16 weeks

## 2019-04-01 RX ORDER — METHYLPHENIDATE HYDROCHLORIDE 36 MG/1
72 TABLET ORAL DAILY
Qty: 60 TABLET | Refills: 0 | Status: SHIPPED | OUTPATIENT
Start: 2019-04-01 | End: 2019-05-06 | Stop reason: SDUPTHER

## 2019-05-06 RX ORDER — METHYLPHENIDATE HYDROCHLORIDE 36 MG/1
72 TABLET ORAL DAILY
Qty: 60 TABLET | Refills: 0 | Status: SHIPPED | OUTPATIENT
Start: 2019-05-06 | End: 2019-06-06 | Stop reason: SDUPTHER

## 2019-06-06 RX ORDER — METHYLPHENIDATE HYDROCHLORIDE 36 MG/1
72 TABLET ORAL DAILY
Qty: 60 TABLET | Refills: 0 | Status: SHIPPED | OUTPATIENT
Start: 2019-06-06 | End: 2019-06-18 | Stop reason: SDUPTHER

## 2019-06-18 ENCOUNTER — OFFICE VISIT (OUTPATIENT)
Dept: PSYCHIATRY | Facility: CLINIC | Age: 24
End: 2019-06-18

## 2019-06-18 VITALS
WEIGHT: 272.2 LBS | SYSTOLIC BLOOD PRESSURE: 140 MMHG | HEIGHT: 70 IN | HEART RATE: 99 BPM | DIASTOLIC BLOOD PRESSURE: 89 MMHG | BODY MASS INDEX: 38.97 KG/M2

## 2019-06-18 DIAGNOSIS — F90.2 ATTENTION DEFICIT HYPERACTIVITY DISORDER, COMBINED TYPE: Primary | ICD-10-CM

## 2019-06-18 PROCEDURE — 99213 OFFICE O/P EST LOW 20 MIN: CPT | Performed by: NURSE PRACTITIONER

## 2019-06-18 PROCEDURE — 90833 PSYTX W PT W E/M 30 MIN: CPT | Performed by: NURSE PRACTITIONER

## 2019-06-18 RX ORDER — METHYLPHENIDATE HYDROCHLORIDE 36 MG/1
72 TABLET ORAL DAILY
Qty: 60 TABLET | Refills: 0 | Status: SHIPPED | OUTPATIENT
Start: 2019-06-18 | End: 2019-08-12 | Stop reason: SDUPTHER
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
## 2019-08-12 RX ORDER — METHYLPHENIDATE HYDROCHLORIDE 36 MG/1
72 TABLET ORAL DAILY
Qty: 60 TABLET | Refills: 0 | Status: SHIPPED | OUTPATIENT
Start: 2019-08-12 | End: 2019-09-12 | Stop reason: SDUPTHER

## 2019-09-16 RX ORDER — METHYLPHENIDATE HYDROCHLORIDE 36 MG/1
72 TABLET ORAL DAILY
Qty: 60 TABLET | Refills: 0 | Status: SHIPPED | OUTPATIENT
Start: 2019-09-16 | End: 2019-09-19 | Stop reason: SDUPTHER

## 2019-09-19 ENCOUNTER — OFFICE VISIT (OUTPATIENT)
Dept: PSYCHIATRY | Facility: CLINIC | Age: 24
End: 2019-09-19

## 2019-09-19 VITALS
BODY MASS INDEX: 39.6 KG/M2 | SYSTOLIC BLOOD PRESSURE: 134 MMHG | HEIGHT: 70 IN | WEIGHT: 276.6 LBS | HEART RATE: 78 BPM | DIASTOLIC BLOOD PRESSURE: 89 MMHG

## 2019-09-19 DIAGNOSIS — F90.2 ATTENTION DEFICIT HYPERACTIVITY DISORDER, COMBINED TYPE: Primary | ICD-10-CM

## 2019-09-19 PROCEDURE — 99213 OFFICE O/P EST LOW 20 MIN: CPT | Performed by: NURSE PRACTITIONER

## 2019-09-19 RX ORDER — METHYLPHENIDATE HYDROCHLORIDE 36 MG/1
72 TABLET ORAL DAILY
Qty: 60 TABLET | Refills: 0 | Status: SHIPPED | OUTPATIENT
Start: 2019-09-19 | End: 2019-10-21 | Stop reason: SDUPTHER

## 2019-09-19 NOTE — PROGRESS NOTES
Subjective   Ben Florez is a 24 y.o. male is here today for medication management follow-up at the [place], he presents to his appointment. He is accompanied by his wife with whom he gives permission to speak in front of openly.      Chief Complaint:  ADHD    History of Present Illness  He states that he is doing really well today. He states that he does not have any side effects from his medication and admits to proper compliance. Denies any ADHD symptoms. He states that he is sleeping pretty well, but the last few days has been difficult due to allergies and band practice. He normally gets about 8 hours of sleep a night and denies NM. He states that his appetite is well with stable BMI. He denies any pain today. He denies any current life stressors. Denies AV hallucinations, denies SI/HI.       The following portions of the patient's history were reviewed and updated as appropriate: allergies, current medications, past family history, past medical history, past social history, past surgical history and problem list.    Review of Systems   Constitutional: Negative for appetite change, chills, diaphoresis, fatigue, fever and unexpected weight change.   HENT: Negative for hearing loss, sore throat, trouble swallowing and voice change.    Eyes: Negative for photophobia and visual disturbance.   Respiratory: Negative for cough, chest tightness and shortness of breath.    Cardiovascular: Negative for chest pain and palpitations.   Gastrointestinal: Negative for abdominal pain, constipation, nausea and vomiting.   Endocrine: Negative for cold intolerance and heat intolerance.   Genitourinary: Negative for dysuria and frequency.   Musculoskeletal: Negative for arthralgias, back pain, joint swelling and neck stiffness.   Skin: Negative for color change and wound.   Allergic/Immunologic: Negative for environmental allergies and immunocompromised state.   Neurological: Negative for dizziness, tremors, seizures,  "syncope, weakness, light-headedness and headaches.   Hematological: Negative for adenopathy. Does not bruise/bleed easily.       Objective   Physical Exam   Constitutional: He appears well-developed and well-nourished. No distress.   Neurological: He is alert. Coordination and gait normal.   Vitals reviewed.    Blood pressure 134/89, pulse 78, height 177.8 cm (70\"), weight 125 kg (276 lb 9.6 oz). Body mass index is 39.69 kg/m².    Medication List:   Current Outpatient Medications   Medication Sig Dispense Refill   • methylphenidate (CONCERTA) 36 MG CR tablet Take 2 tablets by mouth Daily 60 tablet 0     No current facility-administered medications for this visit.        Mental Status Exam:   Hygiene:   good  Cooperation:  Cooperative  Eye Contact:  Fair  Psychomotor Behavior:  Appropriate  Affect:  Appropriate  Hopelessness: Denies  Speech:  Normal  Thought Process:  Linear  Thought Content:  Mood congurent  Suicidal:  None  Homicidal:  None  Hallucinations:  None  Delusion:  None  Memory:  Intact  Orientation:  Person, Place, Time and Situation  Reliability:  fair  Insight:  Fair  Judgement:  Fair  Impulse Control:  Fair  Physical/Medical Issues:  No     Assessment/Plan   Problems Addressed this Visit     None      Visit Diagnoses     Attention deficit hyperactivity disorder, combined type    -  Primary    Relevant Medications    methylphenidate (CONCERTA) 36 MG CR tablet        Discussed medication options. Continue methylphenidate for his ADHD.  Reviewed the risks, benefits, and side effects of the medications; patient acknowledged and verbally consented.  Patient is agreeable to call the [facility].  Patient is aware to call 911 or go to the nearest ER should begin having SI/HI. He is aware that concerta is a controlled substance and that regular Jared reviews will be performed as well as random UDS.      Prognosis: Guarded dependent on medication, follow up appointment and treatment plan compliance "     Functionality:  Good.  Symptoms of ADHD are under control which enables the patient to work full time with no problems.        Return in 12 weeks

## 2019-10-21 RX ORDER — METHYLPHENIDATE HYDROCHLORIDE 36 MG/1
72 TABLET ORAL DAILY
Qty: 60 TABLET | Refills: 0 | Status: SHIPPED | OUTPATIENT
Start: 2019-10-21 | End: 2019-11-25 | Stop reason: SDUPTHER

## 2019-11-25 RX ORDER — METHYLPHENIDATE HYDROCHLORIDE 36 MG/1
72 TABLET ORAL DAILY
Qty: 60 TABLET | Refills: 0 | Status: SHIPPED | OUTPATIENT
Start: 2019-11-25 | End: 2019-12-12 | Stop reason: SDUPTHER

## 2019-12-12 ENCOUNTER — OFFICE VISIT (OUTPATIENT)
Dept: PSYCHIATRY | Facility: CLINIC | Age: 24
End: 2019-12-12

## 2019-12-12 VITALS
SYSTOLIC BLOOD PRESSURE: 135 MMHG | HEIGHT: 70 IN | DIASTOLIC BLOOD PRESSURE: 89 MMHG | BODY MASS INDEX: 40.2 KG/M2 | WEIGHT: 280.8 LBS | HEART RATE: 90 BPM

## 2019-12-12 DIAGNOSIS — F90.2 ATTENTION DEFICIT HYPERACTIVITY DISORDER, COMBINED TYPE: ICD-10-CM

## 2019-12-12 DIAGNOSIS — Z79.899 MEDICATION MANAGEMENT: Primary | ICD-10-CM

## 2019-12-12 LAB
AMPHETAMINE CUT-OFF: NORMAL
BENZODIAZIPINE CUT-OFF: NORMAL
BUPRENORPHINE CUT-OFF: NORMAL
COCAINE CUT-OFF: NORMAL
EXTERNAL AMPHETAMINE SCREEN URINE: NEGATIVE
EXTERNAL BENZODIAZEPINE SCREEN URINE: NEGATIVE
EXTERNAL BUPRENORPHINE SCREEN URINE: NEGATIVE
EXTERNAL COCAINE SCREEN URINE: NEGATIVE
EXTERNAL MDMA: NEGATIVE
EXTERNAL METHADONE SCREEN URINE: NEGATIVE
EXTERNAL METHAMPHETAMINE SCREEN URINE: NEGATIVE
EXTERNAL OPIATES SCREEN URINE: NEGATIVE
EXTERNAL OXYCODONE SCREEN URINE: NEGATIVE
EXTERNAL THC SCREEN URINE: NEGATIVE
MDMA CUT-OFF: NORMAL
METHADONE CUT-OFF: NORMAL
METHAMPHETAMINE CUT-OFF: NORMAL
OPIATES CUT-OFF: NORMAL
OXYCODONE CUT-OFF: NORMAL
THC CUT-OFF: NORMAL

## 2019-12-12 PROCEDURE — 99213 OFFICE O/P EST LOW 20 MIN: CPT | Performed by: NURSE PRACTITIONER

## 2019-12-12 PROCEDURE — 90833 PSYTX W PT W E/M 30 MIN: CPT | Performed by: NURSE PRACTITIONER

## 2019-12-12 RX ORDER — METHYLPHENIDATE HYDROCHLORIDE 36 MG/1
72 TABLET ORAL DAILY
Qty: 60 TABLET | Refills: 0 | Status: SHIPPED | OUTPATIENT
Start: 2019-12-12 | End: 2020-02-03 | Stop reason: SDUPTHER

## 2019-12-12 NOTE — PROGRESS NOTES
Subjective   Ben Florez is a 24 y.o. male is here today for medication management follow-up at the [facility], he presents to his appointment.     Chief Complaint:  ADHD    History of Present Illness  The patient states that he feels pretty good today. The patient states that he is taking his medication as prescribed. Denies SE. Denies any physical illness. Rates his ADHD symptoms as 1/10 with 10 being most inattentive and hyperactive. States that he is getting 7-8 hours per day on average. Denies NM. States that the appetite is good with 4 lbs weight gain since last visits. States there are lots of stuff need to be done right now, which keeps him busy. Denies any SI/HI. Denies any AV hallucination.     The following portions of the patient's history were reviewed and updated as appropriate: allergies, current medications, past family history, past medical history, past social history, past surgical history and problem list.    Review of Systems   Constitutional: Negative for appetite change, chills, diaphoresis, fatigue, fever and unexpected weight change.   HENT: Negative for hearing loss, sore throat, trouble swallowing and voice change.    Eyes: Negative for photophobia and visual disturbance.   Respiratory: Negative for cough, chest tightness and shortness of breath.    Cardiovascular: Negative for chest pain and palpitations.   Gastrointestinal: Negative for abdominal pain, constipation, nausea and vomiting.   Endocrine: Negative for cold intolerance and heat intolerance.   Genitourinary: Negative for dysuria and frequency.   Musculoskeletal: Negative for arthralgias, back pain, joint swelling and neck stiffness.   Skin: Negative for color change and wound.   Allergic/Immunologic: Negative for environmental allergies and immunocompromised state.   Neurological: Negative for dizziness, tremors, seizures, syncope, weakness, light-headedness and headaches.   Hematological: Negative for adenopathy. Does not  "bruise/bleed easily.       Objective   Physical Exam   Constitutional: He appears well-developed and well-nourished. No distress.   Neurological: He is alert. Coordination and gait normal.   Vitals reviewed.    Blood pressure 135/89, pulse 90, height 177.8 cm (70\"), weight 127 kg (280 lb 12.8 oz). Body mass index is 40.29 kg/m².    Medication List:   Current Outpatient Medications   Medication Sig Dispense Refill   • methylphenidate (CONCERTA) 36 MG CR tablet Take 2 tablets by mouth Daily 60 tablet 0     No current facility-administered medications for this visit.        Mental Status Exam:   Hygiene:   good  Cooperation:  Cooperative  Eye Contact:  Fair  Psychomotor Behavior:  Appropriate  Affect:  Appropriate  Hopelessness: Denies  Speech:  Normal  Thought Process:  Linear  Thought Content:  Mood congurent  Suicidal:  None  Homicidal:  None  Hallucinations:  None  Delusion:  None  Memory:  Intact  Orientation:  Person, Place, Time and Situation  Reliability:  fair  Insight:  Fair  Judgement:  Fair  Impulse Control:  Fair  Physical/Medical Issues:  No     Assessment/Plan   Problems Addressed this Visit     None      Visit Diagnoses     Medication management    -  Primary    Relevant Medications    methylphenidate (CONCERTA) 36 MG CR tablet    Other Relevant Orders    adsquareoxFactorlix Drug Screen (Completed)    Attention deficit hyperactivity disorder, combined type        Relevant Medications    methylphenidate (CONCERTA) 36 MG CR tablet        Discussed medication options. Continue methylphenidate for his ADHD.  Reviewed the risks, benefits, and side effects of the medications; patient acknowledged and verbally consented.  Patient is agreeable to call the [facility] with any worsening of symptoms.  Patient is aware to call 911 or go to the nearest ER should begin having SI/HI. He is aware that concerta is a controlled substance and that regular Jared reviews will be performed as well as random UDS.      Prognosis: " Guarded dependent on medication, follow up appointment and treatment plan compliance     Functionality:  Good.  Symptoms of ADHD are under control which enables the patient to work full time with no problems.      Start Time: 810am   Stop Time: 820am  20 minutes face to face with patient  was spent for psychotherapy. Assisted patient in processing patient’s ADHD. Acknowledged and normalized patient’s thoughts, feelings, and concerns. Utilized cognitive behavioral therapy to assist the patient in recognizing more appropriate coping mechanisms when he becomes agitated/anxious which are proven effective in reducing the severity of frequency of symptoms. Strongly urged to continue to eat better balanced and healthier foods in reducing further health risks. Allowed patient to freely discuss issues without interruption or judgment.    Unable to complete treatment plan related to unavailability of ADHD in adult population.     Return in 12 weeks

## 2020-02-03 DIAGNOSIS — F90.2 ATTENTION DEFICIT HYPERACTIVITY DISORDER, COMBINED TYPE: ICD-10-CM

## 2020-02-03 DIAGNOSIS — Z79.899 MEDICATION MANAGEMENT: ICD-10-CM

## 2020-02-03 RX ORDER — METHYLPHENIDATE HYDROCHLORIDE 36 MG/1
72 TABLET ORAL DAILY
Qty: 60 TABLET | Refills: 0 | Status: SHIPPED | OUTPATIENT
Start: 2020-02-03 | End: 2020-03-12 | Stop reason: SDUPTHER

## 2020-03-12 ENCOUNTER — OFFICE VISIT (OUTPATIENT)
Dept: PSYCHIATRY | Facility: CLINIC | Age: 25
End: 2020-03-12

## 2020-03-12 VITALS
DIASTOLIC BLOOD PRESSURE: 94 MMHG | HEART RATE: 94 BPM | HEIGHT: 70 IN | WEIGHT: 284 LBS | SYSTOLIC BLOOD PRESSURE: 151 MMHG | BODY MASS INDEX: 40.66 KG/M2

## 2020-03-12 DIAGNOSIS — F90.2 ATTENTION DEFICIT HYPERACTIVITY DISORDER, COMBINED TYPE: Primary | ICD-10-CM

## 2020-03-12 DIAGNOSIS — Z79.899 MEDICATION MANAGEMENT: ICD-10-CM

## 2020-03-12 PROCEDURE — 99213 OFFICE O/P EST LOW 20 MIN: CPT | Performed by: NURSE PRACTITIONER

## 2020-03-12 RX ORDER — METHYLPHENIDATE HYDROCHLORIDE 36 MG/1
72 TABLET ORAL DAILY
Qty: 60 TABLET | Refills: 0 | Status: SHIPPED | OUTPATIENT
Start: 2020-03-12 | End: 2020-06-15 | Stop reason: SDUPTHER

## 2020-03-12 NOTE — PROGRESS NOTES
Subjective   Ben Florez is a 24 y.o. male is here today for medication management follow-up at the [facility], he presents to his appointment.     Chief Complaint:  ADHD    History of Present Illness  He states that he has been doing alright, he states that he has been busy staying active.  He denies any problems with the medications; he denies any SE or problems.  He had to stop the medications for about a month because of finances; he states that without the medications he tends to have OCD tendencies more.  He shares that without the medication he tends to have less motivation and is more procrastinating.  He also states that his anxiety today is about 2/10 with 10 being the worse.  He has noticed that he has had a sniffling tic, collar and hair on arm is an issue without the medications.  Denies any feelings of sadness, but overwhelmed feelings.  He states that he has been averaging about 10 hours per night with no NM.  He states that his appetite has been good with slight weight gain.  He states that he has been having issues with sinuses, he has been having fluid in his ears. Denies any acute stressors but has multiple things that need to be done. He is currently not working anywhere. Denies any AV hallucinations, denies any SI/HI.      The following portions of the patient's history were reviewed and updated as appropriate: allergies, current medications, past family history, past medical history, past social history, past surgical history and problem list.    Review of Systems   Constitutional: Negative for appetite change, chills, diaphoresis, fatigue, fever and unexpected weight change.   HENT: Negative for hearing loss, sore throat, trouble swallowing and voice change.    Eyes: Negative for photophobia and visual disturbance.   Respiratory: Negative for cough, chest tightness and shortness of breath.    Cardiovascular: Negative for chest pain and palpitations.   Gastrointestinal: Negative for  "abdominal pain, constipation, nausea and vomiting.   Endocrine: Negative for cold intolerance and heat intolerance.   Genitourinary: Negative for dysuria and frequency.   Musculoskeletal: Negative for arthralgias, back pain, joint swelling and neck stiffness.   Skin: Negative for color change and wound.   Allergic/Immunologic: Negative for environmental allergies and immunocompromised state.   Neurological: Negative for dizziness, tremors, seizures, syncope, weakness, light-headedness and headaches.   Hematological: Negative for adenopathy. Does not bruise/bleed easily.       Objective   Physical Exam   Constitutional: He appears well-developed and well-nourished. No distress.   Neurological: He is alert. Coordination and gait normal.   Vitals reviewed.    Blood pressure 151/94, pulse 94, height 177.8 cm (70\"), weight 129 kg (284 lb). Body mass index is 40.75 kg/m².    Medication List:   Current Outpatient Medications   Medication Sig Dispense Refill   • methylphenidate (CONCERTA) 36 MG CR tablet Take 2 tablets by mouth Daily 60 tablet 0     No current facility-administered medications for this visit.        Mental Status Exam:   Hygiene:   good  Cooperation:  Cooperative  Eye Contact:  Fair  Psychomotor Behavior:  Appropriate  Affect:  Appropriate  Hopelessness: Denies  Speech:  Normal  Thought Process:  Linear  Thought Content:  Mood congurent  Suicidal:  None  Homicidal:  None  Hallucinations:  None  Delusion:  None  Memory:  Intact  Orientation:  Person, Place, Time and Situation  Reliability:  fair  Insight:  Fair  Judgement:  Fair  Impulse Control:  Fair  Physical/Medical Issues:  No     Assessment/Plan   Problems Addressed this Visit     None      Visit Diagnoses     Attention deficit hyperactivity disorder, combined type    -  Primary    Relevant Medications    methylphenidate (CONCERTA) 36 MG CR tablet    Medication management        Relevant Medications    methylphenidate (CONCERTA) 36 MG CR tablet    "     Discussed medication options. Continue methylphenidate for his ADHD.  Reviewed the risks, benefits, and side effects of the medications; patient acknowledged and verbally consented.  Patient is agreeable to call the [facility] with any worsening of symptoms.  Patient is aware to call 911 or go to the nearest ER should begin having SI/HI. He is aware that concerta is a controlled substance and that regular Jared reviews will be performed as well as random UDS.      Prognosis: Guarded dependent on medication, follow up appointment and treatment plan compliance     Functionality:  Good.  Symptoms of ADHD are under control which enables the patient to work full time with no problems.      Return in 12 weeks

## 2020-06-15 ENCOUNTER — TELEMEDICINE (OUTPATIENT)
Dept: PSYCHIATRY | Facility: CLINIC | Age: 25
End: 2020-06-15

## 2020-06-15 DIAGNOSIS — Z79.899 MEDICATION MANAGEMENT: ICD-10-CM

## 2020-06-15 DIAGNOSIS — F90.2 ATTENTION DEFICIT HYPERACTIVITY DISORDER, COMBINED TYPE: Primary | ICD-10-CM

## 2020-06-15 PROCEDURE — 99213 OFFICE O/P EST LOW 20 MIN: CPT | Performed by: NURSE PRACTITIONER

## 2020-06-15 RX ORDER — METHYLPHENIDATE HYDROCHLORIDE 36 MG/1
72 TABLET ORAL DAILY
Qty: 60 TABLET | Refills: 0 | Status: SHIPPED | OUTPATIENT
Start: 2020-06-15 | End: 2020-09-14 | Stop reason: SDUPTHER

## 2020-06-15 NOTE — PROGRESS NOTES
Patient is being seen via [employer].    This provider is located at [facility] at [address] [place] [address]. The provider identified herself as well as her credentials.   The Patient is at home using his phone with video connection. The patient's condition being diagnosed/treated is appropriate for telemedicine. The patient gave consent to be seen remotely, and when consent is given they understand that the consent allows for patient identifiable information to be sent to a third party as needed.   They may refuse to be seen remotely at any time. The electronic data is encrypted and password protected, and the patient has been advised of the potential risks to privacy not withstanding such measures    Subjective   Ben Florez is a 25 y.o. male is being seen via [employer] as recommended per CDC guidelines associated with Corona Pandemic.    Chief Complaint:  ADHD    History of Present Illness  He states that he is doing fantastic; he denies any problems with his medications.  He states that he is taking the brand name medication since the insurance is only paying for it.  He states that he feels like the ADHD is fairly well controlled with the medications, he states that when he is not on his medicine he eats like crazy.  He state that he went off of it for awhile which gave him a severe headache.  He states that he is taking it as prescribed with no SE or problems.  He rates his ADHD symptoms about 1/10 with 10 being the worse.  He denies any symptoms of depression and anxiety.   He states that he and his wife are not sleeping at a good pattern, there are days that he has slept for 12 hours per night with no NM.  Appetite has been good with no problems with weight.  He states that he and his wife are currently looking for employment, he doesn't want to be stuck at a desk- looking at the [employer].  Denies any other stressors.   He denies any current health issues- he has seasonal  allergies.  Denies any AV hallucinations, denies any SI/HI.      The following portions of the patient's history were reviewed and updated as appropriate: allergies, current medications, past family history, past medical history, past social history, past surgical history and problem list.    Review of Systems   Constitutional: Negative for appetite change, chills, diaphoresis, fatigue, fever and unexpected weight change.   HENT: Negative for hearing loss, sore throat, trouble swallowing and voice change.    Eyes: Negative for photophobia and visual disturbance.   Respiratory: Negative for cough, chest tightness and shortness of breath.    Cardiovascular: Negative for chest pain and palpitations.   Gastrointestinal: Negative for abdominal pain, constipation, nausea and vomiting.   Endocrine: Negative for cold intolerance and heat intolerance.   Genitourinary: Negative for dysuria and frequency.   Musculoskeletal: Negative for arthralgias, back pain, joint swelling and neck stiffness.   Skin: Negative for color change and wound.   Allergic/Immunologic: Negative for environmental allergies and immunocompromised state.   Neurological: Negative for dizziness, tremors, seizures, syncope, weakness, light-headedness and headaches.   Hematological: Negative for adenopathy. Does not bruise/bleed easily.       Objective   Physical Exam   Constitutional: He appears well-developed and well-nourished. No distress.   Neurological: He is alert. Coordination and gait normal.   Vitals reviewed.    There were no vitals taken for this visit. There is no height or weight on file to calculate BMI.    Medication List:   Current Outpatient Medications   Medication Sig Dispense Refill   • methylphenidate (Concerta) 36 MG CR tablet Take 2 tablets by mouth Daily 60 tablet 0     No current facility-administered medications for this visit.        Mental Status Exam:   Hygiene:   good  Cooperation:  Cooperative  Eye Contact:  Fair  Psychomotor  Behavior:  Appropriate  Affect:  Appropriate  Hopelessness: Denies  Speech:  Normal  Thought Process:  Linear  Thought Content:  Mood congurent  Suicidal:  None  Homicidal:  None  Hallucinations:  None  Delusion:  None  Memory:  Intact  Orientation:  Person, Place, Time and Situation  Reliability:  fair  Insight:  Fair  Judgement:  Fair  Impulse Control:  Fair  Physical/Medical Issues:  No     Assessment/Plan   Problems Addressed this Visit     None      Visit Diagnoses     Attention deficit hyperactivity disorder, combined type    -  Primary    Relevant Medications    methylphenidate (Concerta) 36 MG CR tablet    Medication management        Relevant Medications    methylphenidate (Concerta) 36 MG CR tablet        Discussed medication options. Continue methylphenidate for his ADHD.  Reviewed the risks, benefits, and side effects of the medications; patient acknowledged and verbally consented.  Patient is agreeable to call the [facility] with any worsening of symptoms.  Patient is aware to call 911 or go to the nearest ER should begin having SI/HI. He is aware that concerta is a controlled substance and that regular Jared reviews will be performed as well as random UDS.      Prognosis: Guarded dependent on medication, follow up appointment and treatment plan compliance     Functionality:  Good.  Symptoms of ADHD are under control which enables the patient to work full time with no problems.          Return in 12 weeks

## 2020-09-14 ENCOUNTER — TELEMEDICINE (OUTPATIENT)
Dept: PSYCHIATRY | Facility: CLINIC | Age: 25
End: 2020-09-14

## 2020-09-14 DIAGNOSIS — Z79.899 MEDICATION MANAGEMENT: ICD-10-CM

## 2020-09-14 DIAGNOSIS — F90.2 ATTENTION DEFICIT HYPERACTIVITY DISORDER, COMBINED TYPE: Primary | ICD-10-CM

## 2020-09-14 PROCEDURE — 99213 OFFICE O/P EST LOW 20 MIN: CPT | Performed by: NURSE PRACTITIONER

## 2020-09-14 RX ORDER — METHYLPHENIDATE HYDROCHLORIDE 36 MG/1
72 TABLET ORAL DAILY
Qty: 60 TABLET | Refills: 0 | Status: SHIPPED | OUTPATIENT
Start: 2020-09-14 | End: 2020-12-07 | Stop reason: SDUPTHER

## 2020-09-14 NOTE — PROGRESS NOTES
Patient is being seen via [employer].    This provider is located at [facility] at [address] [place] [address]. The provider identified herself as well as her credentials.   The Patient is at home using his phone with video connection. The patient's condition being diagnosed/treated is appropriate for telemedicine. The patient gave consent to be seen remotely, and when consent is given they understand that the consent allows for patient identifiable information to be sent to a third party as needed.   They may refuse to be seen remotely at any time. The electronic data is encrypted and password protected, and the patient has been advised of the potential risks to privacy not withstanding such measures    Subjective   Ben Florez is a 25 y.o. male is being seen via [employer] as recommended per CDC guidelines associated with Corona Pandemic.    Chief Complaint:  ADHD    History of Present Illness  He states that things are going fairly well, he states that he is taking as prescribed with no SE or problems.  He states that with his medications he would rate his ADHD symptoms about 0/10 with 10 being the worse.  He has been working at the [employer] for [employer] and is trying to get use to it.  He states that he is getting about 8 hours per night with no NM.  Appetite has been good with weight gain reported.  He states that he has been having sinus issues; soreness from a job.  He states that his only stressor is his new job.  He states that his wife is currently looking for employment as a teacher.  Denies any SI/HI, denies any AV hallucinations.     The following portions of the patient's history were reviewed and updated as appropriate: allergies, current medications, past family history, past medical history, past social history, past surgical history and problem list.    Review of Systems   Constitutional: Negative for appetite change, chills, diaphoresis, fatigue, fever and  unexpected weight change.   HENT: Negative for hearing loss, sore throat, trouble swallowing and voice change.    Eyes: Negative for photophobia and visual disturbance.   Respiratory: Negative for cough, chest tightness and shortness of breath.    Cardiovascular: Negative for chest pain and palpitations.   Gastrointestinal: Negative for abdominal pain, constipation, nausea and vomiting.   Endocrine: Negative for cold intolerance and heat intolerance.   Genitourinary: Negative for dysuria and frequency.   Musculoskeletal: Negative for arthralgias, back pain, joint swelling and neck stiffness.   Skin: Negative for color change and wound.   Allergic/Immunologic: Negative for environmental allergies and immunocompromised state.   Neurological: Negative for dizziness, tremors, seizures, syncope, weakness, light-headedness and headaches.   Hematological: Negative for adenopathy. Does not bruise/bleed easily.       Objective   Physical Exam   Constitutional: He appears well-developed. No distress.   Neurological: He is alert. Coordination and gait normal.   Vitals reviewed.    There were no vitals taken for this visit. There is no height or weight on file to calculate BMI.    Medication List:   Current Outpatient Medications   Medication Sig Dispense Refill   • methylphenidate (Concerta) 36 MG CR tablet Take 2 tablets by mouth Daily 60 tablet 0     No current facility-administered medications for this visit.        Mental Status Exam:   Hygiene:   good  Cooperation:  Cooperative  Eye Contact:  Fair  Psychomotor Behavior:  Appropriate  Affect:  Appropriate  Hopelessness: Denies  Speech:  Normal  Thought Process:  Linear  Thought Content:  Mood congurent  Suicidal:  None  Homicidal:  None  Hallucinations:  None  Delusion:  None  Memory:  Intact  Orientation:  Person, Place, Time and Situation  Reliability:  fair  Insight:  Fair  Judgement:  Fair  Impulse Control:  Fair  Physical/Medical Issues:  No     Assessment/Plan    Problems Addressed this Visit     None      Visit Diagnoses     Attention deficit hyperactivity disorder, combined type    -  Primary    Relevant Medications    methylphenidate (Concerta) 36 MG CR tablet    Medication management        Relevant Medications    methylphenidate (Concerta) 36 MG CR tablet        Discussed medication options. Continue methylphenidate for his ADHD.  Reviewed the risks, benefits, and side effects of the medications; patient acknowledged and verbally consented.  Patient is agreeable to call the [facility] with any worsening of symptoms.  Patient is aware to call 911 or go to the nearest ER should begin having SI/HI. He is aware that concerta is a controlled substance and that regular Jared reviews will be performed as well as random UDS.      Prognosis: Guarded dependent on medication, follow up appointment and treatment plan compliance     Functionality:  Good.  Symptoms of ADHD are under control which enables the patient to work full time with no problems.        Return in 12 weeks

## 2020-12-07 DIAGNOSIS — F90.2 ATTENTION DEFICIT HYPERACTIVITY DISORDER, COMBINED TYPE: ICD-10-CM

## 2020-12-07 DIAGNOSIS — Z79.899 MEDICATION MANAGEMENT: ICD-10-CM

## 2020-12-08 RX ORDER — METHYLPHENIDATE HYDROCHLORIDE 36 MG/1
72 TABLET ORAL DAILY
Qty: 60 TABLET | Refills: 0 | Status: SHIPPED | OUTPATIENT
Start: 2020-12-08 | End: 2020-12-10 | Stop reason: SDUPTHER

## 2020-12-10 ENCOUNTER — TELEMEDICINE (OUTPATIENT)
Dept: PSYCHIATRY | Facility: CLINIC | Age: 25
End: 2020-12-10

## 2020-12-10 DIAGNOSIS — F90.2 ATTENTION DEFICIT HYPERACTIVITY DISORDER, COMBINED TYPE: Primary | ICD-10-CM

## 2020-12-10 DIAGNOSIS — Z79.899 MEDICATION MANAGEMENT: ICD-10-CM

## 2020-12-10 PROCEDURE — 99213 OFFICE O/P EST LOW 20 MIN: CPT | Performed by: NURSE PRACTITIONER

## 2020-12-10 RX ORDER — METHYLPHENIDATE HYDROCHLORIDE 36 MG/1
72 TABLET ORAL DAILY
Qty: 60 TABLET | Refills: 0
Start: 2020-12-10 | End: 2021-01-22 | Stop reason: SDUPTHER

## 2020-12-10 NOTE — PROGRESS NOTES
"Patient is being seen via [employer].    This provider is located at [facility] at [address] [place] [address], [place] [address]. The provider identified herself as well as her credentials.   The Patient is at home using his phone with video connection. The patient's condition being diagnosed/treated is appropriate for telemedicine. The patient gave consent to be seen remotely, and when consent is given they understand that the consent allows for patient identifiable information to be sent to a third party as needed.   They may refuse to be seen remotely at any time. The electronic data is encrypted and password protected, and the patient has been advised of the potential risks to privacy not withstanding such measures    Subjective   Ben Florez is a 25 y.o. male is being seen via [employer] as recommended per CDC guidelines associated with Corona Pandemic.    Chief Complaint:  ADHD    History of Present Illness   He states that he has been doing alright, he states that he is getting a \"hang of\" his new job and it has been stressful for him.  He states that he has to keep up to a quota which he has been working on it, this is the best paying job he has had so far.  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 He states that he has been doing real well with his medications, he stays focused most of the time through his work schedule but then it becomes a little more difficult at the end of the shift. Discussed starting Mydayis is needed but will maintain current dose at least through the Holidays and may consider change at the beginning of the year should he continue to have trouble.  He states that he rates his ADHD is about 2/10 with 10 being the worse.  He states that there has been a lot going on with the new job with a slightly increase in stress.  Denies any symptoms of depression.  He states that he worries about missing a day at work, " he feels bad about himself if he doesn't meet his quota.  He states that his sleep has been better, he has been getting about 8 hours per night with no NM.  Appetite has been good with slight weight loss which he associates with his job.  He states that he was COVID positive in October with no residual symptoms- felt like it was allergies.  Denies any AV hallucinations, denies any SI/HI.      The following portions of the patient's history were reviewed and updated as appropriate: allergies, current medications, past family history, past medical history, past social history, past surgical history and problem list.    Review of Systems   Constitutional: Negative for appetite change, chills, diaphoresis, fatigue, fever and unexpected weight change.   HENT: Negative for hearing loss, sore throat, trouble swallowing and voice change.    Eyes: Negative for photophobia and visual disturbance.   Respiratory: Negative for cough, chest tightness and shortness of breath.    Cardiovascular: Negative for chest pain and palpitations.   Gastrointestinal: Negative for abdominal pain, constipation, nausea and vomiting.   Endocrine: Negative for cold intolerance and heat intolerance.   Genitourinary: Negative for dysuria and frequency.   Musculoskeletal: Negative for arthralgias, back pain, joint swelling and neck stiffness.   Skin: Negative for color change and wound.   Allergic/Immunologic: Negative for environmental allergies and immunocompromised state.   Neurological: Negative for dizziness, tremors, seizures, syncope, weakness, light-headedness and headaches.   Hematological: Negative for adenopathy. Does not bruise/bleed easily.       Objective   Physical Exam   Constitutional: He appears well-developed. No distress.   Neurological: He is alert. Coordination and gait normal.   Vitals reviewed.    There were no vitals taken for this visit. There is no height or weight on file to calculate BMI.    Medication List:   Current  Outpatient Medications   Medication Sig Dispense Refill   • methylphenidate (Concerta) 36 MG CR tablet Take 2 tablets by mouth Daily 60 tablet 0     No current facility-administered medications for this visit.        Mental Status Exam:   Hygiene:   good  Cooperation:  Cooperative  Eye Contact:  Fair  Psychomotor Behavior:  Appropriate  Affect:  Appropriate  Hopelessness: Denies  Speech:  Normal  Thought Process:  Linear  Thought Content:  Mood congurent  Suicidal:  None  Homicidal:  None  Hallucinations:  None  Delusion:  None  Memory:  Intact  Orientation:  Person, Place, Time and Situation  Reliability:  fair  Insight:  Fair  Judgement:  Fair  Impulse Control:  Fair  Physical/Medical Issues:  No     Assessment/Plan   Problems Addressed this Visit     None      Visit Diagnoses     Attention deficit hyperactivity disorder, combined type    -  Primary    Relevant Medications    methylphenidate (Concerta) 36 MG CR tablet    Medication management        Relevant Medications    methylphenidate (Concerta) 36 MG CR tablet      Diagnoses       Codes Comments    Attention deficit hyperactivity disorder, combined type    -  Primary ICD-10-CM: F90.2  ICD-9-CM: 314.01     Medication management     ICD-10-CM: Z79.899  ICD-9-CM: V58.69         Discussed medication options. Continue methylphenidate for his ADHD.  May consider mydayis if symptoms persist. Reviewed the risks, benefits, and side effects of the medications; patient acknowledged and verbally consented.  Patient is agreeable to call the [facility] with any worsening of symptoms.  Patient is aware to call 911 or go to the nearest ER should begin having SI/HI. He is aware that concerta is a controlled substance and that regular Jared reviews will be performed as well as random UDS.      Prognosis: Guarded dependent on medication, follow up appointment and treatment plan compliance     Functionality:  Good.  Symptoms of ADHD are under control which enables the  patient to work full time with no problems.      Treatment plan completed on 12/10/2020    Return in 12 weeks

## 2020-12-10 NOTE — TREATMENT PLAN
Multi-Disciplinary Problems (from Behavioral Health Treatment Plan)    Active Problems     Problem: ADHD (Adult)  Start Date: 12/10/20    Problem Details: The patient self-scales this problem as a 2 with 10 being the worst.                         I have discussed and reviewed this treatment plan with the patient.  It has been printed for signatures.

## 2021-01-22 DIAGNOSIS — F90.2 ATTENTION DEFICIT HYPERACTIVITY DISORDER, COMBINED TYPE: ICD-10-CM

## 2021-01-22 DIAGNOSIS — Z79.899 MEDICATION MANAGEMENT: ICD-10-CM

## 2021-01-27 RX ORDER — METHYLPHENIDATE HYDROCHLORIDE 36 MG/1
72 TABLET ORAL DAILY
Qty: 60 TABLET | Refills: 0
Start: 2021-01-27 | End: 2021-01-29 | Stop reason: SDUPTHER

## 2021-01-27 NOTE — TELEPHONE ENCOUNTER
Patient called 1/22 for refill and it's still pending. Can you send in?     MAR IS OUT TODAY, CAN YOU COVER FOR HER AND SEND IN THE RX?

## 2021-01-29 DIAGNOSIS — Z79.899 MEDICATION MANAGEMENT: ICD-10-CM

## 2021-01-29 DIAGNOSIS — F90.2 ATTENTION DEFICIT HYPERACTIVITY DISORDER, COMBINED TYPE: ICD-10-CM

## 2021-01-29 RX ORDER — METHYLPHENIDATE HYDROCHLORIDE 36 MG/1
72 TABLET ORAL DAILY
Qty: 60 TABLET | Refills: 0 | Status: SHIPPED | OUTPATIENT
Start: 2021-01-29 | End: 2021-03-03 | Stop reason: SDUPTHER

## 2021-01-29 RX ORDER — METHYLPHENIDATE HYDROCHLORIDE 36 MG/1
72 TABLET ORAL DAILY
Qty: 60 TABLET | Refills: 0
Start: 2021-01-29 | End: 2021-01-29 | Stop reason: SDUPTHER

## 2021-03-03 DIAGNOSIS — Z79.899 MEDICATION MANAGEMENT: ICD-10-CM

## 2021-03-03 DIAGNOSIS — F90.2 ATTENTION DEFICIT HYPERACTIVITY DISORDER, COMBINED TYPE: ICD-10-CM

## 2021-03-03 RX ORDER — METHYLPHENIDATE HYDROCHLORIDE 36 MG/1
72 TABLET ORAL DAILY
Qty: 14 TABLET | Refills: 0 | Status: SHIPPED | OUTPATIENT
Start: 2021-03-03 | End: 2021-03-11 | Stop reason: SDUPTHER

## 2021-03-11 ENCOUNTER — TELEMEDICINE (OUTPATIENT)
Dept: PSYCHIATRY | Facility: CLINIC | Age: 26
End: 2021-03-11

## 2021-03-11 DIAGNOSIS — Z79.899 MEDICATION MANAGEMENT: ICD-10-CM

## 2021-03-11 DIAGNOSIS — F90.2 ATTENTION DEFICIT HYPERACTIVITY DISORDER, COMBINED TYPE: ICD-10-CM

## 2021-03-11 PROCEDURE — 99213 OFFICE O/P EST LOW 20 MIN: CPT | Performed by: NURSE PRACTITIONER

## 2021-03-11 RX ORDER — METHYLPHENIDATE HYDROCHLORIDE 36 MG/1
72 TABLET ORAL DAILY
Qty: 60 TABLET | Refills: 0 | Status: SHIPPED | OUTPATIENT
Start: 2021-03-11 | End: 2021-04-16 | Stop reason: SDUPTHER

## 2021-03-11 NOTE — PROGRESS NOTES
Patient is being seen via [employer].    This provider is located at [facility] at [address] [place] [address]. The provider identified herself as well as her credentials.   The Patient is at home using his phone with video connection. The patient's condition being diagnosed/treated is appropriate for telemedicine. The patient gave consent to be seen remotely, and when consent is given they understand that the consent allows for patient identifiable information to be sent to a third party as needed.   They may refuse to be seen remotely at any time. The electronic data is encrypted and password protected, and the patient has been advised of the potential risks to privacy not withstanding such measures    Subjective   Ben Florez is a 25 y.o. male is being seen via [employer] as recommended per CDC guidelines associated with Corona Pandemic.    Chief Complaint:  ADHD    History of Present Illness  He states that he is doing just fine; he states that has been doing pretty good with his medication.  He states that he has been trying to keep track of his weight and he has lost about 45 pounds, he states that he relates it to work and not really having time to eat, he is drinking a lot of water.  He states that he feels a lot better with a lot more energy.  He states that he continues to take his medications as prescribed with no SE or problems.  He rates his ADHD symptoms about 1/10 with 10 being the worse.  He denies any symptoms of depression or anxiety.  He states that he has been sleeping well, he is getting about 7-8 hours with occasional NM.  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  Denies any new stressors, he shares that he is trying to save up for a car.  Denies any AV hallucinations, denies any SI/HI.      The following portions of the patient's history were reviewed and updated as appropriate: allergies, current medications,  past family history, past medical history, past social history, past surgical history and problem list.    Review of Systems   Constitutional: Negative for appetite change, chills, diaphoresis, fatigue, fever and unexpected weight change.   HENT: Negative for hearing loss, sore throat, trouble swallowing and voice change.    Eyes: Negative for photophobia and visual disturbance.   Respiratory: Negative for cough, chest tightness and shortness of breath.    Cardiovascular: Negative for chest pain and palpitations.   Gastrointestinal: Negative for abdominal pain, constipation, nausea and vomiting.   Endocrine: Negative for cold intolerance and heat intolerance.   Genitourinary: Negative for dysuria and frequency.   Musculoskeletal: Negative for arthralgias, back pain, joint swelling and neck stiffness.   Skin: Negative for color change and wound.   Allergic/Immunologic: Negative for environmental allergies and immunocompromised state.   Neurological: Negative for dizziness, tremors, seizures, syncope, weakness, light-headedness and headaches.   Hematological: Negative for adenopathy. Does not bruise/bleed easily.       Objective   Physical Exam   Constitutional: He appears well-developed. No distress.   Neurological: He is alert. Coordination and gait normal.   Vitals reviewed.    There were no vitals taken for this visit. There is no height or weight on file to calculate BMI.    Medication List:   Current Outpatient Medications   Medication Sig Dispense Refill   • methylphenidate (Concerta) 36 MG CR tablet Take 2 tablets by mouth Daily 60 tablet 0     No current facility-administered medications for this visit.       Mental Status Exam:   Hygiene:   good  Cooperation:  Cooperative  Eye Contact:  Fair  Psychomotor Behavior:  Appropriate  Affect:  Appropriate  Hopelessness: Denies  Speech:  Normal  Thought Process:  Linear  Thought Content:  Mood congurent  Suicidal:  None  Homicidal:  None  Hallucinations:   None  Delusion:  None  Memory:  Intact  Orientation:  Person, Place, Time and Situation  Reliability:  fair  Insight:  Fair  Judgement:  Fair  Impulse Control:  Fair  Physical/Medical Issues:  No     Assessment/Plan   Problems Addressed this Visit     None      Visit Diagnoses     Medication management        Relevant Medications    methylphenidate (Concerta) 36 MG CR tablet    Attention deficit hyperactivity disorder, combined type        Relevant Medications    methylphenidate (Concerta) 36 MG CR tablet      Diagnoses       Codes Comments    Medication management     ICD-10-CM: Z79.899  ICD-9-CM: V58.69     Attention deficit hyperactivity disorder, combined type     ICD-10-CM: F90.2  ICD-9-CM: 314.01         Discussed medication options. Continue methylphenidate for his ADHD.  May consider mydayis if symptoms persist. Reviewed the risks, benefits, and side effects of the medications; patient acknowledged and verbally consented.  Patient is agreeable to call the [facility] with any worsening of symptoms.  Patient is aware to call 911 or go to the nearest ER should begin having SI/HI. He is aware that concerta is a controlled substance and that regular Jared reviews will be performed as well as random UDS.      Prognosis: Guarded dependent on medication, follow up appointment and treatment plan compliance     Functionality:  Good.  Symptoms of ADHD are under control which enables the patient to work full time with no problems.      Treatment plan completed on 12/10/2020    Return in 12 weeks

## 2021-03-18 ENCOUNTER — BULK ORDERING (OUTPATIENT)
Dept: CASE MANAGEMENT | Facility: OTHER | Age: 26
End: 2021-03-18

## 2021-03-18 DIAGNOSIS — Z23 IMMUNIZATION DUE: ICD-10-CM

## 2021-04-16 ENCOUNTER — TELEPHONE (OUTPATIENT)
Dept: PSYCHIATRY | Facility: CLINIC | Age: 26
End: 2021-04-16

## 2021-04-16 DIAGNOSIS — F90.2 ATTENTION DEFICIT HYPERACTIVITY DISORDER, COMBINED TYPE: ICD-10-CM

## 2021-04-16 DIAGNOSIS — Z79.899 MEDICATION MANAGEMENT: ICD-10-CM

## 2021-04-16 RX ORDER — METHYLPHENIDATE HYDROCHLORIDE 36 MG/1
72 TABLET ORAL DAILY
Qty: 60 TABLET | Refills: 0 | Status: SHIPPED | OUTPATIENT
Start: 2021-04-16 | End: 2021-04-19 | Stop reason: SDUPTHER

## 2021-04-19 DIAGNOSIS — F90.2 ATTENTION DEFICIT HYPERACTIVITY DISORDER, COMBINED TYPE: ICD-10-CM

## 2021-04-19 DIAGNOSIS — Z79.899 MEDICATION MANAGEMENT: ICD-10-CM

## 2021-04-20 RX ORDER — METHYLPHENIDATE HYDROCHLORIDE 36 MG/1
72 TABLET ORAL DAILY
Qty: 60 TABLET | Refills: 0 | Status: SHIPPED | OUTPATIENT
Start: 2021-04-20 | End: 2021-05-18 | Stop reason: SDUPTHER

## 2021-05-18 DIAGNOSIS — Z79.899 MEDICATION MANAGEMENT: ICD-10-CM

## 2021-05-18 DIAGNOSIS — F90.2 ATTENTION DEFICIT HYPERACTIVITY DISORDER, COMBINED TYPE: ICD-10-CM

## 2021-05-19 RX ORDER — METHYLPHENIDATE HYDROCHLORIDE 36 MG/1
72 TABLET ORAL DAILY
Qty: 60 TABLET | Refills: 0 | Status: SHIPPED | OUTPATIENT
Start: 2021-05-19 | End: 2021-05-28 | Stop reason: SDUPTHER

## 2021-05-28 DIAGNOSIS — F90.2 ATTENTION DEFICIT HYPERACTIVITY DISORDER, COMBINED TYPE: ICD-10-CM

## 2021-05-28 DIAGNOSIS — Z79.899 MEDICATION MANAGEMENT: ICD-10-CM

## 2021-05-28 RX ORDER — METHYLPHENIDATE HYDROCHLORIDE 36 MG/1
72 TABLET ORAL DAILY
Qty: 60 TABLET | Refills: 0 | Status: CANCELLED | OUTPATIENT
Start: 2021-05-28

## 2021-05-28 RX ORDER — METHYLPHENIDATE HYDROCHLORIDE 36 MG/1
72 TABLET ORAL DAILY
Qty: 60 TABLET | Refills: 0 | Status: SHIPPED | OUTPATIENT
Start: 2021-05-28 | End: 2021-06-24 | Stop reason: SDUPTHER

## 2021-05-28 NOTE — TELEPHONE ENCOUNTER
I INFORMED PHARMACIST TO CANCEL PRESCRIPTION FOR METHYLPHENIDATE    INFORMED PATIENT THAT PRESCRIPTION WAS RESENT TO [facility] IN [place].

## 2021-05-28 NOTE — TELEPHONE ENCOUNTER
Andra's patient. Can you cover for her? Patient stated that methyphenidate 36mg was sent to [facility] in [place], but they won't have medication until 6/17. He is requesting for it to be resent to [facility] in [place].

## 2021-06-23 DIAGNOSIS — Z79.899 MEDICATION MANAGEMENT: ICD-10-CM

## 2021-06-23 DIAGNOSIS — F90.2 ATTENTION DEFICIT HYPERACTIVITY DISORDER, COMBINED TYPE: ICD-10-CM

## 2021-06-23 RX ORDER — METHYLPHENIDATE HYDROCHLORIDE 36 MG/1
72 TABLET ORAL DAILY
Qty: 60 TABLET | Refills: 0 | Status: CANCELLED | OUTPATIENT
Start: 2021-06-23

## 2021-06-24 DIAGNOSIS — F90.2 ATTENTION DEFICIT HYPERACTIVITY DISORDER, COMBINED TYPE: ICD-10-CM

## 2021-06-24 DIAGNOSIS — Z79.899 MEDICATION MANAGEMENT: ICD-10-CM

## 2021-06-24 RX ORDER — METHYLPHENIDATE HYDROCHLORIDE 36 MG/1
72 TABLET ORAL DAILY
Qty: 60 TABLET | Refills: 0 | Status: SHIPPED | OUTPATIENT
Start: 2021-06-24 | End: 2021-07-28 | Stop reason: SDUPTHER

## 2021-07-28 DIAGNOSIS — F90.2 ATTENTION DEFICIT HYPERACTIVITY DISORDER, COMBINED TYPE: ICD-10-CM

## 2021-07-28 DIAGNOSIS — Z79.899 MEDICATION MANAGEMENT: ICD-10-CM

## 2021-07-28 RX ORDER — METHYLPHENIDATE HYDROCHLORIDE 36 MG/1
72 TABLET ORAL DAILY
Qty: 60 TABLET | Refills: 0 | Status: SHIPPED | OUTPATIENT
Start: 2021-07-28 | End: 2021-08-19 | Stop reason: SDUPTHER

## 2021-08-19 ENCOUNTER — TELEMEDICINE (OUTPATIENT)
Dept: PSYCHIATRY | Facility: CLINIC | Age: 26
End: 2021-08-19

## 2021-08-19 DIAGNOSIS — Z79.899 MEDICATION MANAGEMENT: ICD-10-CM

## 2021-08-19 DIAGNOSIS — F90.2 ATTENTION DEFICIT HYPERACTIVITY DISORDER, COMBINED TYPE: ICD-10-CM

## 2021-08-19 PROCEDURE — 99213 OFFICE O/P EST LOW 20 MIN: CPT | Performed by: NURSE PRACTITIONER

## 2021-08-19 RX ORDER — METHYLPHENIDATE HYDROCHLORIDE 36 MG/1
72 TABLET ORAL DAILY
Qty: 60 TABLET | Refills: 0 | Status: SHIPPED | OUTPATIENT
Start: 2021-08-19 | End: 2021-10-05 | Stop reason: SDUPTHER

## 2021-08-19 NOTE — PROGRESS NOTES
Patient is being seen via [employer].    This provider is located at [facility] at [address] [place] [address]. The provider identified herself as well as her credentials.   The Patient is at home using his phone with video connection. The patient's condition being diagnosed/treated is appropriate for telemedicine. The patient gave consent to be seen remotely, and when consent is given they understand that the consent allows for patient identifiable information to be sent to a third party as needed.   They may refuse to be seen remotely at any time. The electronic data is encrypted and password protected, and the patient has been advised of the potential risks to privacy not withstanding such measures    Subjective   Ben Florez is a 26 y.o. male is being seen via [employer] as recommended per CDC guidelines associated with Corona Pandemic.    Chief Complaint:  ADHD    History of Present Illness  He states that he has been doing ok, taking his medications as prescribed with no SE or problems.  He states that he can tell that the medication begins to wear off by the end of his shift; he states that there are days that he gets done earlier then others.  He rates his ADHD symptoms about 2/10 with 10 being the worse.  He states that they are getting ready to go on vacation and he has been procrastinating.  He continues to work at [employer].  He states that he there are nights that he doesn't sleep that well, especially if he eats at night.  He states that he is getting between 6-7 hours per night with no NM.  He has been eating well with no significant weight changes.  He states that he has been having problems with allergies.  He states that he and his wife are going to [place] recently.  He states that he worries about the health of his wife who is pregnant with their son. Denies any AV hallucinations, denies any SI/IH.  Denies any substance use.      The following  Simple Astigmatism OD/Clinical Emmetropia OS w/Presbyopia-  discussed findings w/patient-  new spectacle Rx issued-  continue to monitor yearly or prns/p LASIK OU-  discussed findings w/patient-  stable findings at this time-  continue to monitor yearly or prn; 's Notes: MR 5/27/2021D 5/27/2021 portions of the patient's history were reviewed and updated as appropriate: allergies, current medications, past family history, past medical history, past social history, past surgical history and problem list.    Review of Systems   Psychiatric/Behavioral: Positive for decreased concentration.       Objective   Physical Exam   Constitutional: He appears well-developed. No distress.   Neurological: He is alert. Coordination and gait normal.   Vitals reviewed.    There were no vitals taken for this visit. There is no height or weight on file to calculate BMI.    Medication List:   Current Outpatient Medications   Medication Sig Dispense Refill   • methylphenidate (Concerta) 36 MG CR tablet Take 2 tablets by mouth Daily 60 tablet 0     No current facility-administered medications for this visit.       Mental Status Exam:   Hygiene:   good  Cooperation:  Cooperative  Eye Contact:  Fair  Psychomotor Behavior:  Appropriate  Affect:  Appropriate  Hopelessness: Denies  Speech:  Normal  Thought Process:  Linear  Thought Content:  Mood congurent  Suicidal:  None  Homicidal:  None  Hallucinations:  None  Delusion:  None  Memory:  Intact  Orientation:  Person, Place, Time and Situation  Reliability:  fair  Insight:  Fair  Judgement:  Fair  Impulse Control:  Fair  Physical/Medical Issues:  No     Assessment/Plan   Problems Addressed this Visit     None      Visit Diagnoses     Medication management        Relevant Medications    methylphenidate (Concerta) 36 MG CR tablet    Attention deficit hyperactivity disorder, combined type        Relevant Medications    methylphenidate (Concerta) 36 MG CR tablet      Diagnoses       Codes Comments    Medication management     ICD-10-CM: Z79.899  ICD-9-CM: V58.69     Attention deficit hyperactivity disorder, combined type     ICD-10-CM: F90.2  ICD-9-CM: 314.01         Discussed medication options. Continue methylphenidate for his ADHD.   Reviewed the risks, benefits, and side effects of  the medications; patient acknowledged and verbally consented.  Patient is agreeable to call the [facility] with any worsening of symptoms.  Patient is aware to call 911 or go to the nearest ER should begin having SI/HI. He is aware that concerta is a controlled substance and that regular Jared reviews will be performed as well as random UDS.   No changes to medications needed.     Prognosis: Guarded dependent on medication, follow up appointment and treatment plan compliance     Functionality:  Good.  Symptoms of ADHD are under control which enables the patient to work full time with no problems.      Treatment plan completed on 12/10/2020    Return in 12 weeks

## 2021-10-05 DIAGNOSIS — F90.2 ATTENTION DEFICIT HYPERACTIVITY DISORDER, COMBINED TYPE: ICD-10-CM

## 2021-10-05 DIAGNOSIS — Z79.899 MEDICATION MANAGEMENT: ICD-10-CM

## 2021-10-05 RX ORDER — METHYLPHENIDATE HYDROCHLORIDE 36 MG/1
72 TABLET ORAL DAILY
Qty: 60 TABLET | Refills: 0 | Status: SHIPPED | OUTPATIENT
Start: 2021-10-05 | End: 2021-11-11 | Stop reason: SDUPTHER

## 2021-11-11 DIAGNOSIS — Z79.899 MEDICATION MANAGEMENT: ICD-10-CM

## 2021-11-11 DIAGNOSIS — F90.2 ATTENTION DEFICIT HYPERACTIVITY DISORDER, COMBINED TYPE: ICD-10-CM

## 2021-11-11 RX ORDER — METHYLPHENIDATE HYDROCHLORIDE 36 MG/1
72 TABLET ORAL DAILY
Qty: 60 TABLET | Refills: 0 | Status: SHIPPED | OUTPATIENT
Start: 2021-11-11 | End: 2021-11-16 | Stop reason: SDUPTHER

## 2021-11-16 ENCOUNTER — TELEMEDICINE (OUTPATIENT)
Dept: PSYCHIATRY | Facility: CLINIC | Age: 26
End: 2021-11-16

## 2021-11-16 DIAGNOSIS — F90.2 ATTENTION DEFICIT HYPERACTIVITY DISORDER, COMBINED TYPE: ICD-10-CM

## 2021-11-16 DIAGNOSIS — Z79.899 MEDICATION MANAGEMENT: ICD-10-CM

## 2021-11-16 PROCEDURE — 99213 OFFICE O/P EST LOW 20 MIN: CPT | Performed by: NURSE PRACTITIONER

## 2021-11-16 RX ORDER — METHYLPHENIDATE HYDROCHLORIDE 36 MG/1
72 TABLET ORAL DAILY
Qty: 60 TABLET | Refills: 0 | Status: SHIPPED | OUTPATIENT
Start: 2021-11-16 | End: 2021-12-27 | Stop reason: SDUPTHER

## 2021-11-16 NOTE — PROGRESS NOTES
"Patient is being seen via [employer].    This provider is located at [facility] at [address] [place] [address]. The provider identified herself as well as her credentials.   The Patient is at home using his phone with video connection. The patient's condition being diagnosed/treated is appropriate for telemedicine. The patient gave consent to be seen remotely, and when consent is given they understand that the consent allows for patient identifiable information to be sent to a third party as needed.   They may refuse to be seen remotely at any time. The electronic data is encrypted and password protected, and the patient has been advised of the potential risks to privacy not withstanding such measures    Subjective   Ben Florez is a 26 y.o. male is being seen via [employer] as recommended per CDC guidelines associated with Corona Pandemic.    Chief Complaint:  ADHD    History of Present Illness  He states that he is doing ok but is having some sinus issues.  He states that he is doing well with his medications, shares that he is taking it as prescribed with no SE or problems.  He states that he rates his ADHD symptoms about 1/10 with 10 being the worse.  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  Denies any symptoms of depression or anxiety.  He states that he is waiting on the addition to the family; a little boy that they are naming Janell that is due in 2.5 weeks.  He is nervous and excited about the event.  He states that he is sleeping ok on most nights, he is getting about 7 hours per night with no NM.  He states that his appetite has been good with no significant weight changes. Denies any other health issues; he is stressed out about work and the \"new addition\".  Denies any AV hallucinations, denies any SI/HI.  Denies any substance use.       The following portions of the patient's history were reviewed and updated as appropriate: " allergies, current medications, past family history, past medical history, past social history, past surgical history and problem list.    Review of Systems   Psychiatric/Behavioral: Positive for decreased concentration.       Objective   Physical Exam   Constitutional: He appears well-developed. No distress.   Neurological: He is alert. Coordination and gait normal.   Vitals reviewed.    There were no vitals taken for this visit. There is no height or weight on file to calculate BMI.    Medication List:   Current Outpatient Medications   Medication Sig Dispense Refill   • methylphenidate (Concerta) 36 MG CR tablet Take 2 tablets by mouth Daily 60 tablet 0     No current facility-administered medications for this visit.       Mental Status Exam:   Hygiene:   good  Cooperation:  Cooperative  Eye Contact:  Fair  Psychomotor Behavior:  Appropriate  Affect:  Appropriate  Hopelessness: Denies  Speech:  Normal  Thought Process:  Linear  Thought Content:  Mood congurent  Suicidal:  None  Homicidal:  None  Hallucinations:  None  Delusion:  None  Memory:  Intact  Orientation:  Person, Place, Time and Situation  Reliability:  fair  Insight:  Fair  Judgement:  Fair  Impulse Control:  Fair  Physical/Medical Issues:  No     Assessment/Plan   Problems Addressed this Visit     None      Visit Diagnoses     Medication management        Relevant Medications    methylphenidate (Concerta) 36 MG CR tablet    Attention deficit hyperactivity disorder, combined type        Relevant Medications    methylphenidate (Concerta) 36 MG CR tablet      Diagnoses       Codes Comments    Medication management     ICD-10-CM: Z79.899  ICD-9-CM: V58.69     Attention deficit hyperactivity disorder, combined type     ICD-10-CM: F90.2  ICD-9-CM: 314.01         Discussed medication options. Continue methylphenidate for his ADHD.   Reviewed the risks, benefits, and side effects of the medications; patient acknowledged and verbally consented.  Patient is  agreeable to call the [facility] with any worsening of symptoms.  Patient is aware to call 911 or go to the nearest ER should begin having SI/HI. He is aware that concerta is a controlled substance and that regular Jared reviews will be performed as well as random UDS.   No changes to medications needed.     Prognosis: Guarded dependent on medication, follow up appointment and treatment plan compliance     Functionality:  Good.  Symptoms of ADHD are under control which enables the patient to work full time with no problems.      Treatment plan completed on 12/10/2020    Return in 12 weeks

## 2021-12-27 DIAGNOSIS — F90.2 ATTENTION DEFICIT HYPERACTIVITY DISORDER, COMBINED TYPE: ICD-10-CM

## 2021-12-27 DIAGNOSIS — Z79.899 MEDICATION MANAGEMENT: ICD-10-CM

## 2021-12-27 RX ORDER — METHYLPHENIDATE HYDROCHLORIDE 36 MG/1
72 TABLET ORAL DAILY
Qty: 60 TABLET | Refills: 0 | Status: CANCELLED | OUTPATIENT
Start: 2021-12-27

## 2021-12-27 RX ORDER — METHYLPHENIDATE HYDROCHLORIDE 36 MG/1
72 TABLET ORAL DAILY
Qty: 60 TABLET | Refills: 0 | Status: SHIPPED | OUTPATIENT
Start: 2021-12-27 | End: 2022-02-09 | Stop reason: SDUPTHER

## 2021-12-27 NOTE — TELEPHONE ENCOUNTER
Andra Patient. Will need an appointment to establish care with a provider in this office.     Can you cover ?    Routing to the front office so an appointment can be scheduled.

## 2021-12-28 NOTE — TELEPHONE ENCOUNTER
We have him scheduled with Andra until new provider starts then we will schedule with her. We are doing it this way to keep the patients from falling thru the cracks so to speak.

## 2022-02-09 DIAGNOSIS — F90.2 ATTENTION DEFICIT HYPERACTIVITY DISORDER, COMBINED TYPE: ICD-10-CM

## 2022-02-09 DIAGNOSIS — Z79.899 MEDICATION MANAGEMENT: ICD-10-CM

## 2022-02-09 RX ORDER — METHYLPHENIDATE HYDROCHLORIDE 36 MG/1
72 TABLET ORAL DAILY
Qty: 60 TABLET | Refills: 0 | Status: SHIPPED | OUTPATIENT
Start: 2022-02-09 | End: 2022-02-28 | Stop reason: SDUPTHER

## 2022-02-28 ENCOUNTER — OFFICE VISIT (OUTPATIENT)
Dept: PSYCHIATRY | Facility: CLINIC | Age: 27
End: 2022-02-28

## 2022-02-28 VITALS
HEART RATE: 83 BPM | BODY MASS INDEX: 32.78 KG/M2 | HEIGHT: 70 IN | WEIGHT: 229 LBS | DIASTOLIC BLOOD PRESSURE: 80 MMHG | SYSTOLIC BLOOD PRESSURE: 122 MMHG

## 2022-02-28 DIAGNOSIS — F90.2 ATTENTION DEFICIT HYPERACTIVITY DISORDER, COMBINED TYPE: Primary | ICD-10-CM

## 2022-02-28 DIAGNOSIS — Z79.899 MEDICATION MANAGEMENT: ICD-10-CM

## 2022-02-28 PROCEDURE — 90792 PSYCH DIAG EVAL W/MED SRVCS: CPT | Performed by: NURSE PRACTITIONER

## 2022-02-28 RX ORDER — METHYLPHENIDATE HYDROCHLORIDE 36 MG/1
72 TABLET ORAL DAILY
Qty: 60 TABLET | Refills: 0 | Status: SHIPPED | OUTPATIENT
Start: 2022-02-28 | End: 2022-03-18 | Stop reason: SDUPTHER

## 2022-02-28 NOTE — PROGRESS NOTES
Subjective   Ben Florez is a 26 y.o. male who presents today for initial evaluation     Chief Complaint:  ADHD    History of Present Illness:   Mr. Florez presents today for medication management follow up at the [facility] for initial visit after being transferred from [facility] for continuation of treatment.  Has history of ADHD that was initially diagnosed when in first grade.  Says that he has been on other medications in the past but does not recall all of the names  He does say that he recalls trying Vyvanse, that caused agitation and irritability.  Has been on methylphenidate for several years.  Verbalizes that he does well with medication, denies any adverse effects.  Feels that symptoms are well controlled, rating ADHD symptoms as 1/10 with 10 being the worst.  Says that he tried to go without medication for a couple months about 1 year ago but symptoms were so severe that it affected his work.  Denies any issues with sleep other than having a 4-month-old infant at home.  Says that he is typically able to obtain about 6 to 8 hours of sleep per night.  Denies any issues with anxiety or depression.  Denies any cardiac issues, CP/SOA.  Denies any SI/HI or AV hallucinations.     The following portions of the patient's history were reviewed and updated as appropriate: allergies, current medications, past family history, past medical history, past social history, past surgical history and problem list.      Past Medical History:  Past Medical History:   Diagnosis Date   • ADHD (attention deficit hyperactivity disorder)    • Guillain-Hubbard syndrome (HCC)        Social History:  Social History     Socioeconomic History   • Marital status:    Tobacco Use   • Smoking status: Never Smoker   • Smokeless tobacco: Never Used   Substance and Sexual Activity   • Alcohol use: No   • Drug use: No       Family History:  Family History   Problem Relation Age of Onset   • ADD / ADHD Father    • Anxiety disorder  "Father    • Depression Father    • Anxiety disorder Maternal Grandfather    • Depression Maternal Grandfather        Past Surgical History:  History reviewed. No pertinent surgical history.    Problem List:  There is no problem list on file for this patient.      Allergy:   Allergies   Allergen Reactions   • Influenza Vaccine Live    • Meningococcal And Conjugated Vaccines         Current Medications:   Current Outpatient Medications   Medication Sig Dispense Refill   • methylphenidate (Concerta) 36 MG CR tablet Take 2 tablets by mouth Daily 60 tablet 0     No current facility-administered medications for this visit.       Review of Symptoms:    Review of Systems   Constitutional: Negative for appetite change and fatigue.   Respiratory: Negative for shortness of breath.    Cardiovascular: Negative for chest pain.   Psychiatric/Behavioral: Positive for decreased concentration. Negative for sleep disturbance and suicidal ideas.         Physical Exam:   Physical Exam  Vitals reviewed.   Constitutional:       Appearance: Normal appearance.   Neurological:      Mental Status: He is alert.      Gait: Gait normal.         Vitals:   Blood pressure 122/80, pulse 83, height 177.8 cm (70\"), weight 104 kg (229 lb).    Mental Status Exam:   Hygiene:   good  Cooperation:  Cooperative  Eye Contact:  Good  Psychomotor Behavior:  Appropriate  Affect:  Appropriate  Mood: normal  Hopelessness: Denies  Speech:  Normal  Thought Process:  Goal directed and Linear  Thought Content:  Mood congruent  Suicidal:  None  Homicidal:  None  Hallucinations:  None  Delusion:  None  Memory:  Intact  Orientation:  Person, Place, Time and Situation  Reliability:  good  Insight:  Good and Fair  Judgement:  Good and Fair  Impulse Control:  Good and Fair    Lab Results:   Office Visit on 02/28/2022   Component Date Value Ref Range Status   • External Amphetamine Screen Urine 02/28/2022 Negative   Final   • Amphetamine Cut-Off 02/28/2022 1000ng/ml   Final "   • External Benzodiazepine Screen Uri* 02/28/2022 Negative   Final   • Benzodiazipine Cut-Off 02/28/2022 300ng/ml   Final   • External Cocaine Screen Urine 02/28/2022 Negative   Final   • Cocaine Cut-Off 02/28/2022 300ng/ml   Final   • External THC Screen Urine 02/28/2022 Negative   Final   • THC Cut-Off 02/28/2022 50ng/ml   Final   • External Methadone Screen Urine 02/28/2022 Negative   Final   • Methadone Cut-Off 02/28/2022 300ng/ml   Final   • External Methamphetamine Screen Ur* 02/28/2022 Negative   Final   • Methamphetamine Cut-Off 02/28/2022 1000ng/ml   Final   • External Oxycodone Screen Urine 02/28/2022 Negative   Final   • Oxycodone Cut-Off 02/28/2022 100ng/ml   Final   • External Buprenorphine Screen Urine 02/28/2022 Negative   Final   • Buprenorphine Cut-Off 02/28/2022 10ng/ml   Final   • External MDMA 02/28/2022 Negative   Final   • MDMA Cut-Off 02/28/2022 500ng/mln   Final   • External Opiates Screen Urine 02/28/2022 Negative   Final   • Opiates Cut-Off 02/28/2022 300ng/ml   Final       EKG Results:  No orders to display       Assessment/Plan   Problems Addressed this Visit     None      Visit Diagnoses     Attention deficit hyperactivity disorder, combined type    -  Primary    Relevant Medications    methylphenidate (Concerta) 36 MG CR tablet    Medication management        Relevant Medications    methylphenidate (Concerta) 36 MG CR tablet    Other Relevant Orders    [facility] Screen (Completed)      Diagnoses       Codes Comments    Attention deficit hyperactivity disorder, combined type    -  Primary ICD-10-CM: F90.2  ICD-9-CM: 314.01     Medication management     ICD-10-CM: Z79.899  ICD-9-CM: V58.69           Visit Diagnoses:    ICD-10-CM ICD-9-CM   1. Attention deficit hyperactivity disorder, combined type  F90.2 314.01   2. Medication management  Z79.899 V58.69       GOALS:  Short Term Goals: Patient will be compliant with medication, and patient will have no significant medication related  side effects.  Patient will be engaged in psychotherapy as indicated.  Patient will report subjective improvement of symptoms.  Long term goals: To stabilize mood and treat/improve subjective symptoms, the patient will stay out of the hospital, the patient will be at an optimal level of functioning, and the patient will take all medications as prescribed.  The patient/guardian verbalized understanding and agreement with goals that were mutually set.    TREATMENT PLAN: Continue supportive psychotherapy efforts and medications as indicated for patient's diagnosis.  Pharmacological and Non-Pharmacological treatment options discussed during today's visit. Patient/Guardian acknowledged and verbally consented with current treatment plan and was educated on the importance of compliance with treatment and follow-up appointments.      Discussed medication options and treatment plan of prescribed medication as well as the risks, benefits, any black box warnings, and side effects including potential falls, possible impaired driving, and metabolic adversities among others. Patient is agreeable to call the office with any worsening of symptoms or onset of side effects, or if any concerns or questions arise.  The contact information for the office is made available to the patient. Patient is agreeable to call 911 or go to the nearest ER should they begin having any SI/HI, or if any urgent concerns arise. No medication side effects or related complaints today.     -Reviewed previous available documentation     -Reviewed most recent available labs      -JERRY reviewed and is appropriate. Patient counseled on use of controlled substances.    -Continue medication as previously prescribed, verbalizes adequate control of symptoms with current regimen.  -Continue methylphenidate 36 mg 2 tablets daily for symptoms associated with ADHD.    MEDS ORDERED DURING VISIT:  New Medications Ordered This Visit   Medications   • methylphenidate  (Concerta) 36 MG CR tablet     Sig: Take 2 tablets by mouth Daily     Dispense:  60 tablet     Refill:  0       FOLLOW UP:  Return in about 3 months (around 5/28/2022) for Recheck.             This document has been electronically signed by [facility]  February 28, 2022 16:45 EST    Please note that portions of this note were completed with a voice recognition program. Efforts were made to edit dictation, but occasionally words are mistranscribed.

## 2022-03-18 DIAGNOSIS — F90.2 ATTENTION DEFICIT HYPERACTIVITY DISORDER, COMBINED TYPE: ICD-10-CM

## 2022-03-18 DIAGNOSIS — Z79.899 MEDICATION MANAGEMENT: ICD-10-CM

## 2022-03-18 RX ORDER — METHYLPHENIDATE HYDROCHLORIDE 36 MG/1
72 TABLET ORAL DAILY
Qty: 60 TABLET | Refills: 0 | Status: SHIPPED | OUTPATIENT
Start: 2022-03-18 | End: 2022-04-25 | Stop reason: SDUPTHER

## 2022-04-25 DIAGNOSIS — F90.2 ATTENTION DEFICIT HYPERACTIVITY DISORDER, COMBINED TYPE: ICD-10-CM

## 2022-04-25 DIAGNOSIS — Z79.899 MEDICATION MANAGEMENT: ICD-10-CM

## 2022-04-25 RX ORDER — METHYLPHENIDATE HYDROCHLORIDE 36 MG/1
72 TABLET ORAL DAILY
Qty: 60 TABLET | Refills: 0 | Status: SHIPPED | OUTPATIENT
Start: 2022-04-25 | End: 2022-05-27 | Stop reason: SDUPTHER

## 2022-05-27 ENCOUNTER — TELEMEDICINE (OUTPATIENT)
Dept: PSYCHIATRY | Facility: CLINIC | Age: 27
End: 2022-05-27

## 2022-05-27 DIAGNOSIS — F90.2 ATTENTION DEFICIT HYPERACTIVITY DISORDER, COMBINED TYPE: Primary | ICD-10-CM

## 2022-05-27 PROCEDURE — 99214 OFFICE O/P EST MOD 30 MIN: CPT | Performed by: NURSE PRACTITIONER

## 2022-05-27 RX ORDER — METHYLPHENIDATE HYDROCHLORIDE 36 MG/1
72 TABLET ORAL DAILY
Qty: 60 TABLET | Refills: 0 | Status: SHIPPED | OUTPATIENT
Start: 2022-05-27 | End: 2022-07-07 | Stop reason: SDUPTHER

## 2022-05-27 NOTE — PROGRESS NOTES
This provider is located at [facility], [address] [place] [address], [facility], [address] using a secure [employer] Video Visit through [employer]. Patient is being seen remotely via telehealth at their home address in [place], and stated they are in a secure environment for this session. The patient's condition being diagnosed/treated is appropriate for telemedicine. The provider identified herself as well as her credentials.   The patient, and/or patients guardian, consent to be seen remotely, and when consent is given they understand that the consent allows for patient identifiable information to be sent to a third party as needed.   They may refuse to be seen remotely at any time. The electronic data is encrypted and password protected, and the patient and/or guardian has been advised of the potential risks to privacy not withstanding such measures.     You have chosen to receive care through a telehealth visit.  Do you consent to use a video/audio connection for your medical care today? Yes    Subjective   Ben Florez is a 27 y.o. male who presents today for follow up    Chief Complaint:  ADHD    History of Present Illness:   Mr. Florez presents today for medication management follow up. Feels that he is doing well with current medication regimen. Rating ADHD symptoms as 1/10 with 10 being the worst. He does say that he occasionally gets side tracked depending on situation, but is easily able to redirect himself back to task. Sleeping well, obtains about 6-8 hours each night. Says that he has been struggling with allergy type symptoms, cough, congestion and sinus drainage. Has been taking OTC cold reliever that has provided some relief in symptoms. Denies any cardiac issues, CP/SOA.  Denies any SI/HI or AV hallucinations.    The following portions of the patient's history were reviewed and updated as appropriate: allergies, current medications, past family history, past medical history, past social history, past surgical history  and problem list.      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 No pertinent surgical history.    Problem List:  There is no problem list on file for this patient.      Allergy:   Allergies   Allergen Reactions   • Influenza Vaccine Live    • Meningococcal And Conjugated Vaccines         Current Medications:   Current Outpatient Medications   Medication Sig Dispense Refill   • methylphenidate (Concerta) 36 MG CR tablet Take 2 tablets by mouth Daily for 30 days 60 tablet 0     No current facility-administered medications for this visit.       Review of Symptoms:    Review of Systems   Constitutional: Negative for activity change, appetite change, fatigue, unexpected weight gain and unexpected weight loss.   HENT: Positive for congestion and sore throat.    Respiratory: Positive for cough. Negative for shortness of breath.    Cardiovascular: Negative for chest pain and palpitations.   Psychiatric/Behavioral: Positive for decreased concentration. Negative for dysphoric mood, sleep disturbance and suicidal ideas. The patient is not nervous/anxious.      Physical Exam:   Physical Exam  Constitutional:       General: He is not in acute distress.     Appearance: Normal appearance.   Neurological:      Mental Status: He is alert.       Vitals:   There were no vitals taken for this visit. There is no height or  weight on file to calculate BMI.  Due to extenuating circumstances and possible current health risks associated with the patient being present in a clinical setting (with current health restrictions in place in regards to possible COVID 19 transmission/exposure), the patient was seen remotely today via a MyChart Video Visit through [facility].  Unable to obtain vital signs due to nature of remote visit.    Mental Status Exam:   Hygiene:   DANIEL  Cooperation:  Cooperative  Eye Contact:  DANIEL  Psychomotor Behavior:  Appropriate  Affect:  Appropriate  Mood: normal  Hopelessness: Denies  Speech:  Normal  Thought Process:  Goal directed and Linear  Thought Content:  Mood congruent  Suicidal:  None  Homicidal:  None  Hallucinations:  None  Delusion:  None  Memory:  Intact  Orientation:  Person, Place, Time and Situation  Reliability:  fair  Insight:  Fair  Judgement:  Fair  Impulse Control:  Fair    Lab Results:   Office Visit on 02/28/2022   Component Date Value Ref Range Status   • External Amphetamine Screen Urine 02/28/2022 Negative   Final   • Amphetamine Cut-Off 02/28/2022 1000ng/ml   Final   • External Benzodiazepine Screen Uri* 02/28/2022 Negative   Final   • Benzodiazipine Cut-Off 02/28/2022 300ng/ml   Final   • External Cocaine Screen Urine 02/28/2022 Negative   Final   • Cocaine Cut-Off 02/28/2022 300ng/ml   Final   • External THC Screen Urine 02/28/2022 Negative   Final   • THC Cut-Off 02/28/2022 50ng/ml   Final   • External Methadone Screen Urine 02/28/2022 Negative   Final   • Methadone Cut-Off 02/28/2022 300ng/ml   Final   • External Methamphetamine Screen Ur* 02/28/2022 Negative   Final   • Methamphetamine Cut-Off 02/28/2022 1000ng/ml   Final   • External Oxycodone Screen Urine 02/28/2022 Negative   Final   • Oxycodone Cut-Off 02/28/2022 100ng/ml   Final   • External Buprenorphine Screen Urine 02/28/2022 Negative   Final   • Buprenorphine Cut-Off 02/28/2022 10ng/ml   Final   • External MDMA 02/28/2022 Negative    Final   • MDMA Cut-Off 02/28/2022 500ng/mln   Final   • External Opiates Screen Urine 02/28/2022 Negative   Final   • Opiates Cut-Off 02/28/2022 300ng/ml   Final       EKG Results:  No orders to display       Assessment & Plan   Problems Addressed this Visit    None     Visit Diagnoses     Attention deficit hyperactivity disorder, combined type    -  Primary    Relevant Medications    methylphenidate (Concerta) 36 MG CR tablet      Diagnoses       Codes Comments    Attention deficit hyperactivity disorder, combined type    -  Primary ICD-10-CM: F90.2  ICD-9-CM: 314.01           Visit Diagnoses:    ICD-10-CM ICD-9-CM   1. Attention deficit hyperactivity disorder, combined type  F90.2 314.01       GOALS:  Short Term Goals: Patient will be compliant with medication, and patient will have no significant medication related side effects.  Patient will be engaged in psychotherapy as indicated.  Patient will report subjective improvement of symptoms.  Long term goals: To stabilize mood and treat/improve subjective symptoms, the patient will stay out of the hospital, the patient will be at an optimal level of functioning, and the patient will take all medications as prescribed.  The patient/guardian verbalized understanding and agreement with goals that were mutually set.    TREATMENT PLAN: Continue supportive psychotherapy efforts and medications as indicated for patient's diagnosis.  Pharmacological and Non-Pharmacological treatment options discussed during today's visit. Patient/Guardian acknowledged and verbally consented with current treatment plan and was educated on the importance of compliance with treatment and follow-up appointments.      Discussed medication options and treatment plan of prescribed medication as well as the risks, benefits, any black box warnings, and side effects including potential falls, possible impaired driving, and metabolic adversities among others. Patient is agreeable to call the office  with any worsening of symptoms or onset of side effects, or if any concerns or questions arise.  The contact information for the office is made available to the patient. Patient is agreeable to call 911 or go to the nearest ER should they begin having any SI/HI, or if any urgent concerns arise. No medication side effects or related complaints today.     Discussed medication options and treatment plan of prescribed medication as well as the risks, benefits, any black box warnings, and side effects including potential falls, possible impaired driving, and metabolic adversities among others. Patient is agreeable to call the office with any worsening of symptoms or onset of side effects, or if any concerns or questions arise.  The contact information for the office is made available to the patient. Patient is agreeable to call 911 or go to the nearest ER should they begin having any SI/HI, or if any urgent concerns arise. No medication side effects or related complaints today.      -Reviewed previous available documentation      -Reviewed most recent available labs       -JERRY reviewed and is appropriate. Patient counseled on use of controlled substances.    -Encouraged him to be cautious with OTC congestion medication as these can elevate BP. Encouraged him to increase fluid intake.      -Continue methylphenidate 36 mg 2 tablets daily for symptoms associated with ADHD.    The patient is being prescribed a controlled substance as part of the treatment plan. The patient/guardian has been educated of appropriate use of the medication(s), including risks and side effects such as insomnia, headache, exacerbation of tics, nervousness, irritability, overstimulation, tremor, dizziness, anorexia or change in appetite, nausea, dry mouth, constipation, diarrhea, weight loss, sexual dysfunction, psychotic episodes, seizures, palpitations, tachycardia, hypertension, rare activation (activation of hypomania, zackary, and/or suicidal  ideations), cardiovascular adverse effects including sudden death (especially patients with pre-existing structural abnormalities often associated with a family history of cardiac disease), may slow normal growth in children, weight gain is reported but not expected, sedation is possible but activation is much more common, metabolic adversities, and overdose among others. Patient/guardian is also informed that the medication is to be used by the patient only, the medication is to be used only as directed, and the medication should not be combined with other substances unless directed by a Provider/Prescriber. The patient/guardian verbalized understanding and agreement with this in their own words and wish to continue with current treatment plan.       MEDS ORDERED DURING VISIT:  New Medications Ordered This Visit   Medications   • methylphenidate (Concerta) 36 MG CR tablet     Sig: Take 2 tablets by mouth Daily for 30 days     Dispense:  60 tablet     Refill:  0       FOLLOW UP:  Return in about 3 months (around 8/27/2022) for Recheck.    I spent 30 minutes caring for Mr. Florez on this date of service. This time includes time spent by me in the following activities: preparing for the visit, obtaining and/or reviewing a separately obtained history, performing a medically appropriate examination and/or evaluation, counseling and educating the patient/family/caregiver, ordering medications, tests, or procedures and documenting information in the medical record.           This document has been electronically signed by PRUDENCIO Garcia  May 27, 2022 10:54 EDT    Please note that portions of this note were completed with a voice recognition program. Efforts were made to edit dictation, but occasionally words are mistranscribed.

## 2022-07-07 DIAGNOSIS — F90.2 ATTENTION DEFICIT HYPERACTIVITY DISORDER, COMBINED TYPE: ICD-10-CM

## 2022-07-07 RX ORDER — METHYLPHENIDATE HYDROCHLORIDE 36 MG/1
72 TABLET ORAL DAILY
Qty: 60 TABLET | Refills: 0 | Status: SHIPPED | OUTPATIENT
Start: 2022-07-07 | End: 2022-08-15 | Stop reason: SDUPTHER

## 2022-08-15 DIAGNOSIS — F90.2 ATTENTION DEFICIT HYPERACTIVITY DISORDER, COMBINED TYPE: ICD-10-CM

## 2022-08-15 RX ORDER — METHYLPHENIDATE HYDROCHLORIDE 36 MG/1
72 TABLET ORAL DAILY
Qty: 60 TABLET | Refills: 0 | Status: SHIPPED | OUTPATIENT
Start: 2022-08-15 | End: 2022-09-16 | Stop reason: SDUPTHER

## 2022-09-02 NOTE — PROGRESS NOTES
Subjective   Ben Florez is a 27 y.o. male who presents today for initial evaluation     Chief Complaint:  ADHD    History of Present Illness:   History of Present Illness  Ben Florez is a 27 y.o. male who presents today for medication management follow up at the [facility] for initial visit after being transferred from [facility] for continuation of treatment. He states current medication is working good. He gets up early at 2:30 am, to be there by 4 am, he said it is hard but has been good for him. He has lost weight, from 292 lb to 230 lb, he states it is a good job. ADHD symptoms rated 1-2/10 (with medication), with 10 being the worst. He states medication does start wearing off around 1 pm, but he states that is ok, because once home, he doesn't have so much to remember, ie. Numbers etc. He was diagnosed with ADHD as child, was given Concerta, methylphenidate and vyvanse in the past. He is  x 4 years, has 1 child. He states marriage is good and child is a blessing. Denies depression or anxiety that affects his daily life. Sleeping is ok, getting about 7 hours a night, some days less, some days (weekends) sleeps more. Denies NM. Appetite is good. Denies any health or cardiac issues. Previous psychiatric hospitalizations: No  Previous self harm behaviors: No. Denies substance use. Denies SI/HI/AVH.  Denies thoughts of self-harm. He saw Dr. Velasquez in [place], last saw about 3 to 4 years ago. Denies SOB or chest pain.         The following portions of the patient's history were reviewed and updated as appropriate: allergies, current medications, past family history, past medical history, past social history, past surgical history and problem list.      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 No pertinent surgical history.    Problem List:  There is no problem list on file for this patient.      Allergy:   Allergies   Allergen Reactions   • Influenza Vaccine Live    • Meningococcal And Conjugated Vaccines         Current Medications:   Current Outpatient Medications   Medication Sig Dispense Refill   • methylphenidate (Concerta) 36 MG CR tablet Take 2 tablets by mouth Daily for 30 days 60 tablet 0     No current facility-administered medications for this visit.       Review of Symptoms:    Review of Systems   Psychiatric/Behavioral: Positive for decreased concentration. Negative for hallucinations, self-injury and suicidal ideas.   All other systems reviewed and are negative.      Objective   Physical Exam:   Blood pressure 118/82, pulse 73, height 177.8 cm (70\"), weight 104 kg (230 lb).  Body mass index is 33 kg/m².    Appearance:  male appears stated age, no acute distress noted.    Gait, Station, Strength: Steady, posture erect, WNL      Mental Status Exam: 9/16/22  Hygiene:   good  Cooperation:  Cooperative  Eye Contact:  Good  Psychomotor Behavior:  Appropriate  Affect:  Appropriate  Mood: normal  Hopelessness: Denies  Speech:  Normal  Thought Process:  Linear  Thought Content:  Mood congruent  Suicidal:  None  Homicidal:  None  Hallucinations:  None  Delusion:  None  Memory:  Intact  Orientation:  Person, Place, Time and Situation  Reliability:  fair  Insight:  Fair  Judgement:  Fair  Impulse Control:  Fair  Physical/Medical Issues:  No      PHQ-Score Total:  PHQ-9 Total Score:  "     Lab Results:   No visits with results within 1 Month(s) from this visit.   Latest known visit with results is:   Office Visit on 02/28/2022   Component Date Value Ref Range Status   • External Amphetamine Screen Urine 02/28/2022 Negative   Final   • Amphetamine Cut-Off 02/28/2022 1000ng/ml   Final   • External Benzodiazepine Screen Uri* 02/28/2022 Negative   Final   • Benzodiazipine Cut-Off 02/28/2022 300ng/ml   Final   • External Cocaine Screen Urine 02/28/2022 Negative   Final   • Cocaine Cut-Off 02/28/2022 300ng/ml   Final   • External THC Screen Urine 02/28/2022 Negative   Final   • THC Cut-Off 02/28/2022 50ng/ml   Final   • External Methadone Screen Urine 02/28/2022 Negative   Final   • Methadone Cut-Off 02/28/2022 300ng/ml   Final   • External Methamphetamine Screen Ur* 02/28/2022 Negative   Final   • Methamphetamine Cut-Off 02/28/2022 1000ng/ml   Final   • External Oxycodone Screen Urine 02/28/2022 Negative   Final   • Oxycodone Cut-Off 02/28/2022 100ng/ml   Final   • External Buprenorphine Screen Urine 02/28/2022 Negative   Final   • Buprenorphine Cut-Off 02/28/2022 10ng/ml   Final   • External MDMA 02/28/2022 Negative   Final   • MDMA Cut-Off 02/28/2022 500ng/mln   Final   • External Opiates Screen Urine 02/28/2022 Negative   Final   • Opiates Cut-Off 02/28/2022 300ng/ml   Final       Assessment & Plan   Problems Addressed this Visit    None     Visit Diagnoses     Attention deficit hyperactivity disorder, combined type    -  Primary    Relevant Medications    methylphenidate (Concerta) 36 MG CR tablet    Medication management          Diagnoses       Codes Comments    Attention deficit hyperactivity disorder, combined type    -  Primary ICD-10-CM: F90.2  ICD-9-CM: 314.01     Medication management     ICD-10-CM: Z79.899  ICD-9-CM: V58.69         Social History     Tobacco Use   Smoking Status Never Smoker   Smokeless Tobacco Never Used     JERRY reviewed and appropriate. Patient counseled on use of  controlled substances.       -The benefits of a healthy diet and exercise were discussed with patient, especially the positive effects they have on mental health. Patient encouraged to consider lifestyle modification regarding  diet and exercise patterns to maximize results of mental health treatment.  -Reviewed previous available documentation  -Reviewed most recent available labs   -The patient is being prescribed a controlled substance as part of the treatment plan. The patient has been educated of appropriate use of the medication, including risks and side effects such as somnolence, limited ability to drive and/or work or function safely, potential for dependence, respiratory depression, falls, change in blood pressure, changes in heart rhythm or heart rate, activation of other mental illnesses, and overdose among others. Patient is also informed that the medication is to be used by the patient only, and to avoid any combined use of ETOH, or other substances, with this medication unless prescribed and as directed by a Provider.  The patient verbalized understanding and agreement with this in their own words.        Visit Diagnoses:    ICD-10-CM ICD-9-CM   1. Attention deficit hyperactivity disorder, combined type  F90.2 314.01   2. Medication management  Z79.899 V58.69         TREATMENT PLAN/GOALS: Continue supportive psychotherapy efforts and medications as indicated. Treatment and medication options discussed during today's visit. Patient acknowledged and verbally consented to continue with current treatment plan and was educated on the importance of compliance with treatment and follow-up appointments.    MEDICATION ISSUES:  Discussed medication options and treatment plan of prescribed medication as well as the risks, benefits, and side effects including potential falls, possible impaired driving and metabolic adversities among others. Patient is agreeable to call the office with any worsening of symptoms or  onset of side effects. Patient is agreeable to call 911 or go to the nearest ER should he/she begin having SI/HI.     MEDS ORDERED DURING VISIT:  New Medications Ordered This Visit   Medications   • methylphenidate (Concerta) 36 MG CR tablet     Sig: Take 2 tablets by mouth Daily for 30 days     Dispense:  60 tablet     Refill:  0     -Continue methylphenidate 36 mg 2 tablets daily for symptoms associated with ADHD    Return in about 3 months (around 12/16/2022).       Prognosis: Guarded dependent on medication/follow up and treatment plan compliance.  Functionality: pt showing improvements in important areas of daily functioning.     Short-term goals: Patient will adhere to medication regimen and note continued improvement in symptoms over the next 3 months.   Long-term goals: Patient will be adherent to medication management and psychotherapy with continued improvement in symptoms over the next 6 months        This document has been electronically signed by PRUDENCIO Montez   September 16, 2022 13:14 EDT    Part of this note may be an electronic transcription/translation of spoken language to printed text using the Dragon Dictation System.

## 2022-09-16 ENCOUNTER — OFFICE VISIT (OUTPATIENT)
Dept: PSYCHIATRY | Facility: CLINIC | Age: 27
End: 2022-09-16

## 2022-09-16 VITALS
WEIGHT: 230 LBS | HEIGHT: 70 IN | BODY MASS INDEX: 32.93 KG/M2 | HEART RATE: 73 BPM | SYSTOLIC BLOOD PRESSURE: 118 MMHG | DIASTOLIC BLOOD PRESSURE: 82 MMHG

## 2022-09-16 DIAGNOSIS — F90.2 ATTENTION DEFICIT HYPERACTIVITY DISORDER, COMBINED TYPE: Primary | ICD-10-CM

## 2022-09-16 DIAGNOSIS — Z79.899 MEDICATION MANAGEMENT: ICD-10-CM

## 2022-09-16 PROCEDURE — 99214 OFFICE O/P EST MOD 30 MIN: CPT | Performed by: NURSE PRACTITIONER

## 2022-09-16 RX ORDER — METHYLPHENIDATE HYDROCHLORIDE 36 MG/1
72 TABLET ORAL DAILY
Qty: 60 TABLET | Refills: 0 | Status: SHIPPED | OUTPATIENT
Start: 2022-09-16 | End: 2022-10-14 | Stop reason: SDUPTHER

## 2022-10-14 DIAGNOSIS — F90.2 ATTENTION DEFICIT HYPERACTIVITY DISORDER, COMBINED TYPE: ICD-10-CM

## 2022-10-14 RX ORDER — METHYLPHENIDATE HYDROCHLORIDE 36 MG/1
72 TABLET ORAL DAILY
Qty: 60 TABLET | Refills: 0 | Status: SHIPPED | OUTPATIENT
Start: 2022-10-14 | End: 2022-11-21 | Stop reason: SDUPTHER

## 2022-11-21 DIAGNOSIS — F90.2 ATTENTION DEFICIT HYPERACTIVITY DISORDER, COMBINED TYPE: ICD-10-CM

## 2022-11-21 RX ORDER — METHYLPHENIDATE HYDROCHLORIDE 36 MG/1
72 TABLET ORAL DAILY
Qty: 60 TABLET | Refills: 0 | Status: SHIPPED | OUTPATIENT
Start: 2022-11-21 | End: 2022-12-16 | Stop reason: SDUPTHER

## 2022-12-16 ENCOUNTER — TELEMEDICINE (OUTPATIENT)
Dept: PSYCHIATRY | Facility: CLINIC | Age: 27
End: 2022-12-16

## 2022-12-16 DIAGNOSIS — Z79.899 MEDICATION MANAGEMENT: ICD-10-CM

## 2022-12-16 DIAGNOSIS — F90.2 ATTENTION DEFICIT HYPERACTIVITY DISORDER, COMBINED TYPE: Primary | ICD-10-CM

## 2022-12-16 PROCEDURE — 99214 OFFICE O/P EST MOD 30 MIN: CPT | Performed by: NURSE PRACTITIONER

## 2022-12-16 RX ORDER — METHYLPHENIDATE HYDROCHLORIDE 36 MG/1
72 TABLET ORAL DAILY
Qty: 60 TABLET | Refills: 0 | Status: SHIPPED | OUTPATIENT
Start: 2022-12-16 | End: 2022-12-28 | Stop reason: SDUPTHER

## 2022-12-16 NOTE — PROGRESS NOTES
This provider is located at the [facility] (through [facility]), [address], [place], [address] using a secure MyChart Video Visit through [employer]. Patient is being seen remotely via telehealth at their home address in [place], and stated they are in a secure environment for this session. The patient's condition being diagnosed/treated is appropriate for telemedicine. The provider identified herself as well as her credentials.   The patient, and/or patients guardian, consent to be seen remotely, and when consent is given they understand that the consent allows for patient identifiable information to be sent to a third party as needed.   They may refuse to be seen remotely at any time. The electronic data is encrypted and password protected, and the patient and/or guardian has been advised of the potential risks to privacy not withstanding such measures.    Dez Florez is a 27 y.o. male who presents today for follow up    Chief Complaint:  ADHD    History of Present Illness:   History of Present Illness  Today is a follow up visit. Patient is taking medication as directed, denies side effects. Things are going ok. States medication is working ok  He is recovering from the flu for the past week, still has a cough.   ADHD rated 1/10, with 10 being the worst.  Sleep is good, getting about 6 to 8 hours a night, denies NM.   Appetite is good.  Denies SI/HI/AVH.  Denies thoughts of self-harm.  Chronic health issues, no acute physical or medical issues today        The following portions of the patient's history were reviewed and updated as appropriate: allergies, current medications, past family history, past medical history, past social history, past surgical history and problem list.      Past Medical History:  Past Medical History:   Diagnosis Date   • ADHD (attention deficit hyperactivity disorder)    • Guillain-Rayle syndrome (HCC)        Social  History:  Social History     Socioeconomic History   • Marital status:    Tobacco Use   • Smoking status: Never   • Smokeless tobacco: Never   Substance and Sexual Activity   • Alcohol use: No   • Drug use: No       Family History:  Family History   Problem Relation Age of Onset   • ADD / ADHD Father    • Anxiety disorder Father    • Depression Father    • Anxiety disorder Maternal Grandfather    • Depression Maternal Grandfather        Past Surgical History:  History reviewed. No pertinent surgical history.    Problem List:  There is no problem list on file for this patient.       Allergy:   Allergies   Allergen Reactions   • Influenza Vaccine Live    • Meningococcal And Conjugated Vaccines         Current Medications:   Current Outpatient Medications   Medication Sig Dispense Refill   • methylphenidate (Concerta) 36 MG CR tablet Take 2 tablets by mouth Daily for 30 days 60 tablet 0     No current facility-administered medications for this visit.       Review of Symptoms:    Review of Systems   Psychiatric/Behavioral: Positive for decreased concentration. Negative for hallucinations, self-injury, sleep disturbance, suicidal ideas, depressed mood and stress. The patient is not nervous/anxious.    All other systems reviewed and are negative.        Physical Exam:   There were no vitals taken for this visit.  There is no height or weight on file to calculate BMI.    Appearance:  male appears stated age, no acute distress noted.    Gait, Station, Strength: Steady, posture erect, WNL      Mental Status Exam: 12/16/22  Hygiene:   good  Cooperation:  Cooperative  Eye Contact:  Good  Psychomotor Behavior:  Appropriate  Affect:  Appropriate  Mood: normal  Hopelessness: Denies  Speech:  Normal  Thought Process:  Linear  Thought Content:  Mood congruent  Suicidal:  None  Homicidal:  None  Hallucinations:  None  Delusion:  None  Memory:  Intact  Orientation:  Person, Place, Time and Situation  Reliability:   fair  Insight:  Fair  Judgement:  Fair  Impulse Control:  Fair  Physical/Medical Issues:  No      PHQ-Score Total:  PHQ-9 Total Score:        Lab Results:   No visits with results within 1 Month(s) from this visit.   Latest known visit with results is:   Office Visit on 02/28/2022   Component Date Value Ref Range Status   • External Amphetamine Screen Urine 02/28/2022 Negative   Final   • Amphetamine Cut-Off 02/28/2022 1000ng/ml   Final   • External Benzodiazepine Screen Uri* 02/28/2022 Negative   Final   • Benzodiazipine Cut-Off 02/28/2022 300ng/ml   Final   • External Cocaine Screen Urine 02/28/2022 Negative   Final   • Cocaine Cut-Off 02/28/2022 300ng/ml   Final   • External THC Screen Urine 02/28/2022 Negative   Final   • THC Cut-Off 02/28/2022 50ng/ml   Final   • External Methadone Screen Urine 02/28/2022 Negative   Final   • Methadone Cut-Off 02/28/2022 300ng/ml   Final   • External Methamphetamine Screen Ur* 02/28/2022 Negative   Final   • Methamphetamine Cut-Off 02/28/2022 1000ng/ml   Final   • External Oxycodone Screen Urine 02/28/2022 Negative   Final   • Oxycodone Cut-Off 02/28/2022 100ng/ml   Final   • External Buprenorphine Screen Urine 02/28/2022 Negative   Final   • Buprenorphine Cut-Off 02/28/2022 10ng/ml   Final   • External MDMA 02/28/2022 Negative   Final   • MDMA Cut-Off 02/28/2022 500ng/mln   Final   • External Opiates Screen Urine 02/28/2022 Negative   Final   • Opiates Cut-Off 02/28/2022 300ng/ml   Final       Assessment & Plan   Problems Addressed this Visit    None  Visit Diagnoses     Attention deficit hyperactivity disorder, combined type    -  Primary    Relevant Medications    methylphenidate (Concerta) 36 MG CR tablet    Medication management          Diagnoses       Codes Comments    Attention deficit hyperactivity disorder, combined type    -  Primary ICD-10-CM: F90.2  ICD-9-CM: 314.01     Medication management     ICD-10-CM: Z79.899  ICD-9-CM: V58.69         Social History      Tobacco Use   Smoking Status Never   Smokeless Tobacco Never     JERRY reviewed and appropriate. Patient counseled on use of controlled substances.     -The benefits of a healthy diet and exercise were discussed with patient, especially the positive effects they have on mental health. Patient encouraged to consider lifestyle modification regarding  diet and exercise patterns to maximize results of mental health treatment.  -Reviewed previous available documentation  -Reviewed most recent available labs     -The patient is being prescribed a controlled substance as part of the treatment plan. The patient has been educated of appropriate use of the medication, including risks and side effects such as somnolence, limited ability to drive and/or work or function safely, potential for dependence, respiratory depression, falls, change in blood pressure, changes in heart rhythm or heart rate, activation of other mental illnesses, and overdose among others. Patient is also informed that the medication is to be used by the patient only, and to avoid any combined use of ETOH, or other substances, with this medication unless prescribed and as directed by a Provider.  The patient verbalized understanding and agreement with this in their own words.        Visit Diagnoses:    ICD-10-CM ICD-9-CM   1. Attention deficit hyperactivity disorder, combined type  F90.2 314.01   2. Medication management  Z79.899 V58.69       TREATMENT PLAN/GOALS: Continue supportive psychotherapy efforts and medications as indicated. Treatment and medication options discussed during today's visit. Patient acknowledged and verbally consented to continue with current treatment plan and was educated on the importance of compliance with treatment and follow-up appointments.    MEDICATION ISSUES:    Discussed medication options and treatment plan of prescribed medication as well as the risks, benefits, and side effects including potential falls, possible  impaired driving and metabolic adversities among others. Patient is agreeable to call the office with any worsening of symptoms or onset of side effects. Patient is agreeable to call 911 or go to the nearest ER should he/she begin having SI/HI.     MEDS ORDERED DURING VISIT:  New Medications Ordered This Visit   Medications   • methylphenidate (Concerta) 36 MG CR tablet     Sig: Take 2 tablets by mouth Daily for 30 days     Dispense:  60 tablet     Refill:  0     He states he received a partial fill, please fill this RX when appropriate.     -Continue methylphenidate 36 mg 2 tablets daily for symptoms associated with ADHD    Return in about 3 months (around 3/16/2023).     I spent 30 minutes caring for Ben on this date of service. This time includes time spent by me in the following activities: preparing for the visit, obtaining and/or reviewing a separately obtained history, performing a medically appropriate examination and/or evaluation, counseling and educating the patient/family/caregiver, ordering medications, tests, or procedures and documenting information in the medical record               This document has been electronically signed by PRUDENCIO Rodriguez  December 16, 2022 10:56 EST    Part of this note may be an electronic transcription/translation of spoken language to printed text using the Dragon Dictation System.

## 2022-12-28 DIAGNOSIS — F90.2 ATTENTION DEFICIT HYPERACTIVITY DISORDER, COMBINED TYPE: ICD-10-CM

## 2022-12-28 RX ORDER — METHYLPHENIDATE HYDROCHLORIDE 36 MG/1
72 TABLET ORAL DAILY
Qty: 60 TABLET | Refills: 0 | Status: SHIPPED | OUTPATIENT
Start: 2022-12-28 | End: 2023-01-24 | Stop reason: SDUPTHER

## 2023-01-24 DIAGNOSIS — F90.2 ATTENTION DEFICIT HYPERACTIVITY DISORDER, COMBINED TYPE: ICD-10-CM

## 2023-01-25 ENCOUNTER — TELEPHONE (OUTPATIENT)
Dept: PSYCHIATRY | Facility: CLINIC | Age: 28
End: 2023-01-25
Payer: COMMERCIAL

## 2023-01-25 DIAGNOSIS — F90.2 ATTENTION DEFICIT HYPERACTIVITY DISORDER, COMBINED TYPE: ICD-10-CM

## 2023-01-25 RX ORDER — METHYLPHENIDATE HYDROCHLORIDE 36 MG/1
72 TABLET ORAL DAILY
Qty: 60 TABLET | Refills: 0 | Status: SHIPPED | OUTPATIENT
Start: 2023-01-25 | End: 2023-01-25 | Stop reason: SDUPTHER

## 2023-01-25 RX ORDER — METHYLPHENIDATE HYDROCHLORIDE 36 MG/1
72 TABLET ORAL DAILY
Qty: 60 TABLET | Refills: 0 | Status: SHIPPED | OUTPATIENT
Start: 2023-01-25 | End: 2023-02-02 | Stop reason: SDUPTHER

## 2023-02-02 DIAGNOSIS — F90.2 ATTENTION DEFICIT HYPERACTIVITY DISORDER, COMBINED TYPE: ICD-10-CM

## 2023-02-02 RX ORDER — METHYLPHENIDATE HYDROCHLORIDE 36 MG/1
72 TABLET ORAL DAILY
Qty: 60 TABLET | Refills: 0 | Status: SHIPPED | OUTPATIENT
Start: 2023-02-02 | End: 2023-03-20

## 2023-02-02 NOTE — TELEPHONE ENCOUNTER
Patient is asking for the the Concerta prescription be sent to [facility] in [place] because [facility] in [place] doesn't have the medication in stock. I have updated the pharmacy in patients chart. Thank you

## 2023-03-17 NOTE — PROGRESS NOTES
This provider is located at the [facility] (through [facility]), [address], [place], [address] using a secure MyChart Video Visit through [employer]. Patient is being seen remotely via telehealth at their home address in [place], and stated they are in a secure environment for this session. The patient's condition being diagnosed/treated is appropriate for telemedicine. The provider identified herself as well as her credentials.   The patient, and/or patients guardian, consent to be seen remotely, and when consent is given they understand that the consent allows for patient identifiable information to be sent to a third party as needed.   They may refuse to be seen remotely at any time. The electronic data is encrypted and password protected, and the patient and/or guardian has been advised of the potential risks to privacy not withstanding such measures.    Dez Florez is a 27 y.o. male who presents today for follow up    Chief Complaint:  ADHD    History of Present Illness:   History of Present Illness  Today is a follow up visit. Patient is taking medication as directed, denies side effects. He hasn't gotten medication filled, due to pharmacy supply issues.   ADHD rated 7/10, with 10 being the worst.  Sleep is poor, getting about 5 hours a night, denies NM.   Appetite is up and down, some days he eats more than he should..  Denies SI/HI/AVH.  Denies thoughts of self-harm.  Chronic health issues, no acute physical or medical issues today   Prior medications include Concerta, and Vyvanse, (didn't tolerate, caused aggression)         The following portions of the patient's history were reviewed and updated as appropriate: allergies, current medications, past family history, past medical history, past social history, past surgical history and problem list.      Past Medical History:  Past Medical History:   Diagnosis Date   • ADHD (attention deficit  hyperactivity disorder)    • Guillain-Lyons syndrome (HCC)        Social History:  Social History     Socioeconomic History   • Marital status:    Tobacco Use   • Smoking status: Never   • Smokeless tobacco: Never   Substance and Sexual Activity   • Alcohol use: No   • Drug use: No       Family History:  Family History   Problem Relation Age of Onset   • ADD / ADHD Father    • Anxiety disorder Father    • Depression Father    • Anxiety disorder Maternal Grandfather    • Depression Maternal Grandfather        Past Surgical History:  History reviewed. No pertinent surgical history.    Problem List:  There is no problem list on file for this patient.       Allergy:   Allergies   Allergen Reactions   • Influenza Vaccine Live    • Meningococcal And Conjugated Vaccines         Current Medications:   Current Outpatient Medications   Medication Sig Dispense Refill   • amphetamine-dextroamphetamine (Adderall) 10 MG tablet Take 1 tablet by mouth Daily. 30 tablet 0     No current facility-administered medications for this visit.       Review of Symptoms:    Review of Systems   Psychiatric/Behavioral: Positive for decreased concentration, sleep disturbance and stress. Negative for hallucinations, self-injury and suicidal ideas. The patient is not nervous/anxious.    All other systems reviewed and are negative.        Physical Exam:   There were no vitals taken for this visit.  There is no height or weight on file to calculate BMI.    Appearance:  male appears stated age, no acute distress noted.    Gait, Station, Strength: Steady, posture erect, WNL      Mental Status Exam: 3/20/23  Hygiene:   good  Cooperation:  Cooperative  Eye Contact:  Good  Psychomotor Behavior:  Appropriate  Affect:  Appropriate  Mood: normal  Hopelessness: Denies  Speech:  Normal  Thought Process:  Linear  Thought Content:  Mood congruent  Suicidal:  None  Homicidal:  None  Hallucinations:  None  Delusion:  None  Memory:   Intact  Orientation:  Person, Place, Time and Situation  Reliability:  fair  Insight:  Fair  Judgement:  Fair  Impulse Control:  Fair  Physical/Medical Issues:  No      PHQ-Score Total:  PHQ-9 Total Score:        Lab Results:   No visits with results within 1 Month(s) from this visit.   Latest known visit with results is:   Office Visit on 02/28/2022   Component Date Value Ref Range Status   • External Amphetamine Screen Urine 02/28/2022 Negative   Final   • Amphetamine Cut-Off 02/28/2022 1000ng/ml   Final   • External Benzodiazepine Screen Uri* 02/28/2022 Negative   Final   • Benzodiazipine Cut-Off 02/28/2022 300ng/ml   Final   • External Cocaine Screen Urine 02/28/2022 Negative   Final   • Cocaine Cut-Off 02/28/2022 300ng/ml   Final   • External THC Screen Urine 02/28/2022 Negative   Final   • THC Cut-Off 02/28/2022 50ng/ml   Final   • External Methadone Screen Urine 02/28/2022 Negative   Final   • Methadone Cut-Off 02/28/2022 300ng/ml   Final   • External Methamphetamine Screen Ur* 02/28/2022 Negative   Final   • Methamphetamine Cut-Off 02/28/2022 1000ng/ml   Final   • External Oxycodone Screen Urine 02/28/2022 Negative   Final   • Oxycodone Cut-Off 02/28/2022 100ng/ml   Final   • External Buprenorphine Screen Urine 02/28/2022 Negative   Final   • Buprenorphine Cut-Off 02/28/2022 10ng/ml   Final   • External MDMA 02/28/2022 Negative   Final   • MDMA Cut-Off 02/28/2022 500ng/mln   Final   • External Opiates Screen Urine 02/28/2022 Negative   Final   • Opiates Cut-Off 02/28/2022 300ng/ml   Final       Assessment & Plan   Problems Addressed this Visit    None  Visit Diagnoses     Attention deficit hyperactivity disorder, combined type    -  Primary    Relevant Medications    amphetamine-dextroamphetamine (Adderall) 10 MG tablet    Medication management          Diagnoses       Codes Comments    Attention deficit hyperactivity disorder, combined type    -  Primary ICD-10-CM: F90.2  ICD-9-CM: 314.01     Medication  management     ICD-10-CM: Z79.899  ICD-9-CM: V58.69         Social History     Tobacco Use   Smoking Status Never   Smokeless Tobacco Never     JERRY reviewed and appropriate. Patient counseled on use of controlled substances.     -The benefits of a healthy diet and exercise were discussed with patient, especially the positive effects they have on mental health. Patient encouraged to consider lifestyle modification regarding  diet and exercise patterns to maximize results of mental health treatment.  -Reviewed previous available documentation  -Reviewed most recent available labs     -The patient is being prescribed a controlled substance as part of the treatment plan. The patient has been educated of appropriate use of the medication, including risks and side effects such as somnolence, limited ability to drive and/or work or function safely, potential for dependence, respiratory depression, falls, change in blood pressure, changes in heart rhythm or heart rate, activation of other mental illnesses, and overdose among others. Patient is also informed that the medication is to be used by the patient only, and to avoid any combined use of ETOH, or other substances, with this medication unless prescribed and as directed by a Provider.  The patient verbalized understanding and agreement with this in their own words.    -Prior medications include Concerta, and Vyvanse, (didn't tolerate, caused aggression)        Visit Diagnoses:    ICD-10-CM ICD-9-CM   1. Attention deficit hyperactivity disorder, combined type  F90.2 314.01   2. Medication management  Z79.899 V58.69       TREATMENT PLAN/GOALS: Continue supportive psychotherapy efforts and medications as indicated. Treatment and medication options discussed during today's visit. Patient acknowledged and verbally consented to continue with current treatment plan and was educated on the importance of compliance with treatment and follow-up appointments.    MEDICATION  ISSUES:    Discussed medication options and treatment plan of prescribed medication as well as the risks, benefits, and side effects including potential falls, possible impaired driving and metabolic adversities among others. Patient is agreeable to call the office with any worsening of symptoms or onset of side effects. Patient is agreeable to call 911 or go to the nearest ER should he/she begin having SI/HI.     MEDS ORDERED DURING VISIT:  New Medications Ordered This Visit   Medications   • amphetamine-dextroamphetamine (Adderall) 10 MG tablet     Sig: Take 1 tablet by mouth Daily.     Dispense:  30 tablet     Refill:  0     -D/C methylphenidate 36 mg   -Start Adderall 10 mg tablet, take 1 tablet every morning.     Return in about 2 months (around 5/20/2023).     I spent 30 minutes caring for Ben on this date of service. This time includes time spent by me in the following activities: preparing for the visit, obtaining and/or reviewing a separately obtained history, performing a medically appropriate examination and/or evaluation, counseling and educating the patient/family/caregiver, ordering medications, tests, or procedures and documenting information in the medical record             This document has been electronically signed by PRUDENCIO Rodriguez  March 20, 2023 16:00 EDT    Part of this note may be an electronic transcription/translation of spoken language to printed text using the Dragon Dictation System.

## 2023-03-20 ENCOUNTER — TELEMEDICINE (OUTPATIENT)
Dept: PSYCHIATRY | Facility: CLINIC | Age: 28
End: 2023-03-20
Payer: COMMERCIAL

## 2023-03-20 DIAGNOSIS — Z79.899 MEDICATION MANAGEMENT: ICD-10-CM

## 2023-03-20 DIAGNOSIS — F90.2 ATTENTION DEFICIT HYPERACTIVITY DISORDER, COMBINED TYPE: Primary | ICD-10-CM

## 2023-03-20 PROCEDURE — 99214 OFFICE O/P EST MOD 30 MIN: CPT | Performed by: NURSE PRACTITIONER

## 2023-03-20 RX ORDER — DEXTROAMPHETAMINE SACCHARATE, AMPHETAMINE ASPARTATE, DEXTROAMPHETAMINE SULFATE AND AMPHETAMINE SULFATE 2.5; 2.5; 2.5; 2.5 MG/1; MG/1; MG/1; MG/1
10 TABLET ORAL DAILY
Qty: 30 TABLET | Refills: 0 | Status: SHIPPED | OUTPATIENT
Start: 2023-03-20 | End: 2024-03-19

## 2023-04-18 DIAGNOSIS — F90.2 ATTENTION DEFICIT HYPERACTIVITY DISORDER, COMBINED TYPE: ICD-10-CM

## 2023-04-19 RX ORDER — DEXTROAMPHETAMINE SACCHARATE, AMPHETAMINE ASPARTATE, DEXTROAMPHETAMINE SULFATE AND AMPHETAMINE SULFATE 2.5; 2.5; 2.5; 2.5 MG/1; MG/1; MG/1; MG/1
10 TABLET ORAL DAILY
Qty: 30 TABLET | Refills: 0 | Status: SHIPPED | OUTPATIENT
Start: 2023-04-19 | End: 2024-04-18

## 2023-05-15 NOTE — PROGRESS NOTES
This provider is located at the [facility] (through [facility]), [address], [place], [address] using a secure [employer] Video Visit through [employer]. Patient is being seen remotely via telehealth at their home address in [place], and stated they are in a secure environment for this session. The patient's condition being diagnosed/treated is appropriate for telemedicine. The provider identified herself as well as her credentials.   The patient, and/or patients guardian, consent to be seen remotely, and when consent is given they understand that the consent allows for patient identifiable information to be sent to a third party as needed.   They may refuse to be seen remotely at any time. The electronic data is encrypted and password protected, and the patient and/or guardian has been advised of the potential risks to privacy not withstanding such measures.    Dez Florez is a 28 y.o. male who presents today for follow up    Chief Complaint:  ADHD    History of Present Illness:   History of Present Illness  Today is a follow up visit. Patient is NOT taking medication as directed, denies side effects. He has been ok. He has not been able to obtain Adderall due to nationwide shortage.  ADHD rated 5-6/10, with 10 being the worst.  Sleep is poor, getting about 4-5 hours a night, denies NM. He takes occasional nap after he gets home from work, about an hour nap.  Appetite is good..  Denies SI/HI/AVH.  Denies thoughts of self-harm.  Chronic health issues, no acute physical or medical issues today          The following portions of the patient's history were reviewed and updated as appropriate: allergies, current medications, past family history, past medical history, past social history, past surgical history and problem list.      Past Medical History:  Past Medical History:   Diagnosis Date   • ADHD (attention deficit hyperactivity disorder)    • Guillain-Kyburz  syndrome        Social History:  Social History     Socioeconomic History   • Marital status:    Tobacco Use   • Smoking status: Never   • Smokeless tobacco: Never   Substance and Sexual Activity   • Alcohol use: No   • Drug use: No       Family History:  Family History   Problem Relation Age of Onset   • ADD / ADHD Father    • Anxiety disorder Father    • Depression Father    • Anxiety disorder Maternal Grandfather    • Depression Maternal Grandfather        Past Surgical History:  History reviewed. No pertinent surgical history.    Problem List:  There is no problem list on file for this patient.       Allergy:   Allergies   Allergen Reactions   • Influenza Vaccine Live    • Meningococcal And Conjugated Vaccines         Current Medications:   Current Outpatient Medications   Medication Sig Dispense Refill   • amphetamine-dextroamphetamine (Adderall) 10 MG tablet Take 1 tablet by mouth Daily. 30 tablet 0     No current facility-administered medications for this visit.       Review of Symptoms:    Review of Systems   Psychiatric/Behavioral: Positive for decreased concentration, dysphoric mood, sleep disturbance and stress. Negative for hallucinations, self-injury, suicidal ideas and depressed mood. The patient is not nervous/anxious.    All other systems reviewed and are negative.        Physical Exam:   There were no vitals taken for this visit.  There is no height or weight on file to calculate BMI.    Appearance:  male appears stated age, no acute distress noted.    Gait, Station, Strength: Steady, posture erect, WNL      Mental Status Exam: 5/19/23  Hygiene:   good  Cooperation:  Cooperative  Eye Contact:  Good  Psychomotor Behavior:  Appropriate  Affect:  Appropriate  Mood: normal  Hopelessness: Denies  Speech:  Normal  Thought Process:  Linear  Thought Content:  Mood congruent  Suicidal:  None  Homicidal:  None  Hallucinations:  None  Delusion:  None  Memory:  Intact  Orientation:  Person,  Place, Time and Situation  Reliability:  fair  Insight:  Fair  Judgement:  Fair  Impulse Control:  Fair  Physical/Medical Issues:  No      PHQ-Score Total:  PHQ-9 Total Score: 0      Lab Results:   No visits with results within 1 Month(s) from this visit.   Latest known visit with results is:   Office Visit on 02/28/2022   Component Date Value Ref Range Status   • External Amphetamine Screen Urine 02/28/2022 Negative   Final   • Amphetamine Cut-Off 02/28/2022 1000ng/ml   Final   • External Benzodiazepine Screen Uri* 02/28/2022 Negative   Final   • Benzodiazipine Cut-Off 02/28/2022 300ng/ml   Final   • External Cocaine Screen Urine 02/28/2022 Negative   Final   • Cocaine Cut-Off 02/28/2022 300ng/ml   Final   • External THC Screen Urine 02/28/2022 Negative   Final   • THC Cut-Off 02/28/2022 50ng/ml   Final   • External Methadone Screen Urine 02/28/2022 Negative   Final   • Methadone Cut-Off 02/28/2022 300ng/ml   Final   • External Methamphetamine Screen Ur* 02/28/2022 Negative   Final   • Methamphetamine Cut-Off 02/28/2022 1000ng/ml   Final   • External Oxycodone Screen Urine 02/28/2022 Negative   Final   • Oxycodone Cut-Off 02/28/2022 100ng/ml   Final   • External Buprenorphine Screen Urine 02/28/2022 Negative   Final   • Buprenorphine Cut-Off 02/28/2022 10ng/ml   Final   • External MDMA 02/28/2022 Negative   Final   • MDMA Cut-Off 02/28/2022 500ng/mln   Final   • External Opiates Screen Urine 02/28/2022 Negative   Final   • Opiates Cut-Off 02/28/2022 300ng/ml   Final       Assessment & Plan   Problems Addressed this Visit    None  Visit Diagnoses     Attention deficit hyperactivity disorder, combined type    -  Primary    Relevant Medications    amphetamine-dextroamphetamine (Adderall) 10 MG tablet    Medication management          Diagnoses       Codes Comments    Attention deficit hyperactivity disorder, combined type    -  Primary ICD-10-CM: F90.2  ICD-9-CM: 314.01     Medication management     ICD-10-CM:  Z79.899  ICD-9-CM: V58.69         Social History     Tobacco Use   Smoking Status Never   Smokeless Tobacco Never     JERRY reviewed and appropriate. Patient counseled on use of controlled substances.     -The benefits of a healthy diet and exercise were discussed with patient, especially the positive effects they have on mental health. Patient encouraged to consider lifestyle modification regarding  diet and exercise patterns to maximize results of mental health treatment.  -Reviewed previous available documentation  -Reviewed most recent available labs     -The patient is being prescribed a controlled substance as part of the treatment plan. The patient has been educated of appropriate use of the medication, including risks and side effects such as somnolence, limited ability to drive and/or work or function safely, potential for dependence, respiratory depression, falls, change in blood pressure, changes in heart rhythm or heart rate, activation of other mental illnesses, and overdose among others. Patient is also informed that the medication is to be used by the patient only, and to avoid any combined use of ETOH, or other substances, with this medication unless prescribed and as directed by a Provider.  The patient verbalized understanding and agreement with this in their own words.    -Prior medications include Concerta, and Vyvanse, (didn't tolerate, caused aggression)        Visit Diagnoses:    ICD-10-CM ICD-9-CM   1. Attention deficit hyperactivity disorder, combined type  F90.2 314.01   2. Medication management  Z79.899 V58.69       TREATMENT PLAN/GOALS: Continue supportive psychotherapy efforts and medications as indicated. Treatment and medication options discussed during today's visit. Patient acknowledged and verbally consented to continue with current treatment plan and was educated on the importance of compliance with treatment and follow-up appointments.    MEDICATION ISSUES:    Discussed  medication options and treatment plan of prescribed medication as well as the risks, benefits, and side effects including potential falls, possible impaired driving and metabolic adversities among others. Patient is agreeable to call the office with any worsening of symptoms or onset of side effects. Patient is agreeable to call 911 or go to the nearest ER should he/she begin having SI/HI.     MEDS ORDERED DURING VISIT:  New Medications Ordered This Visit   Medications   • amphetamine-dextroamphetamine (Adderall) 10 MG tablet     Sig: Take 1 tablet by mouth Daily.     Dispense:  30 tablet     Refill:  0       -Continue Adderall 10 mg tablet, take 1 tablet every morning.     Return in about 2 months (around 7/19/2023).     I spent 30 minutes caring for Ben on this date of service. This time includes time spent by me in the following activities: preparing for the visit, obtaining and/or reviewing a separately obtained history, performing a medically appropriate examination and/or evaluation, counseling and educating the patient/family/caregiver, ordering medications, tests, or procedures and documenting information in the medical record             This document has been electronically signed by PRUDENCIO Rodriguez  May 19, 2023 14:38 EDT    Part of this note may be an electronic transcription/translation of spoken language to printed text using the Dragon Dictation System.

## 2023-05-19 ENCOUNTER — TELEMEDICINE (OUTPATIENT)
Dept: PSYCHIATRY | Facility: CLINIC | Age: 28
End: 2023-05-19
Payer: COMMERCIAL

## 2023-05-19 DIAGNOSIS — F90.2 ATTENTION DEFICIT HYPERACTIVITY DISORDER, COMBINED TYPE: Primary | ICD-10-CM

## 2023-05-19 DIAGNOSIS — Z79.899 MEDICATION MANAGEMENT: ICD-10-CM

## 2023-05-19 RX ORDER — DEXTROAMPHETAMINE SACCHARATE, AMPHETAMINE ASPARTATE, DEXTROAMPHETAMINE SULFATE AND AMPHETAMINE SULFATE 2.5; 2.5; 2.5; 2.5 MG/1; MG/1; MG/1; MG/1
10 TABLET ORAL DAILY
Qty: 30 TABLET | Refills: 0 | Status: SHIPPED | OUTPATIENT
Start: 2023-05-19 | End: 2024-05-18

## 2023-06-13 ENCOUNTER — TRANSCRIBE ORDERS (OUTPATIENT)
Dept: GENERAL RADIOLOGY | Facility: HOSPITAL | Age: 28
End: 2023-06-13
Payer: COMMERCIAL

## 2023-06-13 ENCOUNTER — HOSPITAL ENCOUNTER (OUTPATIENT)
Dept: GENERAL RADIOLOGY | Facility: HOSPITAL | Age: 28
Discharge: HOME | End: 2023-06-13
Admitting: NURSE PRACTITIONER
Payer: OTHER MISCELLANEOUS

## 2023-06-13 DIAGNOSIS — M25.561 RIGHT KNEE PAIN, UNSPECIFIED CHRONICITY: ICD-10-CM

## 2023-06-13 DIAGNOSIS — M25.561 RIGHT KNEE PAIN, UNSPECIFIED CHRONICITY: Primary | ICD-10-CM

## 2023-06-13 DIAGNOSIS — F90.2 ATTENTION DEFICIT HYPERACTIVITY DISORDER, COMBINED TYPE: ICD-10-CM

## 2023-06-13 PROCEDURE — 73564 X-RAY EXAM KNEE 4 OR MORE: CPT | Performed by: RADIOLOGY

## 2023-06-13 PROCEDURE — 73564 X-RAY EXAM KNEE 4 OR MORE: CPT

## 2023-06-14 RX ORDER — DEXTROAMPHETAMINE SACCHARATE, AMPHETAMINE ASPARTATE, DEXTROAMPHETAMINE SULFATE AND AMPHETAMINE SULFATE 2.5; 2.5; 2.5; 2.5 MG/1; MG/1; MG/1; MG/1
10 TABLET ORAL DAILY
Qty: 30 TABLET | Refills: 0 | Status: SHIPPED | OUTPATIENT
Start: 2023-06-14 | End: 2024-06-13

## 2023-08-25 DIAGNOSIS — F90.2 ATTENTION DEFICIT HYPERACTIVITY DISORDER, COMBINED TYPE: ICD-10-CM

## 2023-08-25 RX ORDER — DEXTROAMPHETAMINE SACCHARATE, AMPHETAMINE ASPARTATE MONOHYDRATE, DEXTROAMPHETAMINE SULFATE AND AMPHETAMINE SULFATE 3.75; 3.75; 3.75; 3.75 MG/1; MG/1; MG/1; MG/1
15 CAPSULE, EXTENDED RELEASE ORAL DAILY
Qty: 30 CAPSULE | Refills: 0 | Status: SHIPPED | OUTPATIENT
Start: 2023-08-25 | End: 2024-08-24

## 2023-10-02 DIAGNOSIS — F90.2 ATTENTION DEFICIT HYPERACTIVITY DISORDER, COMBINED TYPE: ICD-10-CM

## 2023-10-04 RX ORDER — DEXTROAMPHETAMINE SACCHARATE, AMPHETAMINE ASPARTATE MONOHYDRATE, DEXTROAMPHETAMINE SULFATE AND AMPHETAMINE SULFATE 3.75; 3.75; 3.75; 3.75 MG/1; MG/1; MG/1; MG/1
15 CAPSULE, EXTENDED RELEASE ORAL DAILY
Qty: 30 CAPSULE | Refills: 0 | Status: SHIPPED | OUTPATIENT
Start: 2023-10-04 | End: 2024-10-03

## 2023-10-12 NOTE — PROGRESS NOTES
This provider is located at the [facility] (through [facility]), [address], [place], [address] using a secure MyChart Video Visit through [employer]. Patient is being seen remotely via telehealth at their home address in [place], and stated they are in a secure environment for this session. The patient's condition being diagnosed/treated is appropriate for telemedicine. The provider identified herself as well as her credentials.   The patient, and/or patients guardian, consent to be seen remotely, and when consent is given they understand that the consent allows for patient identifiable information to be sent to a third party as needed.   They may refuse to be seen remotely at any time. The electronic data is encrypted and password protected, and the patient and/or guardian has been advised of the potential risks to privacy not withstanding such measures.    Dez Florez is a 28 y.o. male who presents today for follow up    Chief Complaint:  ADHD    History of Present Illness:   History of Present Illness  Today is a follow-up visit.     Medication compliance: patient is compliant with medications, denies SE.  Depression rated 0/10, with 10 being the worst.  Anxiety rated 0/10, with 10 being the worst.  ADHD rated 3/10, with 10 being the worst. (With medication)  Sleep is fair, getting about 6 hours a night,  Nightmares: Denies  Appetite is good.  Hallucinations: Patient denies auditory hallucinations and visual hallucinations  Self-harm: Patient denies thoughts of self-harm.  Alcohol use: no  Drug use: no  Marijuana use: no     Chronic health issues, no acute physical or medical issues today.    Details: States everything has been going good. He states change of medication Adderall XR has helped  his ability to focus and concentrate a lot. He continues to work at [employer] at [employer], it is going ok.       The following portions of the patient's  history were reviewed and updated as appropriate: allergies, current medications, past family history, past medical history, past social history, past surgical history and problem list.      Past Medical History:  Past Medical History:   Diagnosis Date    ADHD (attention deficit hyperactivity disorder)     Guillain-Alta syndrome        Social History:  Social History     Socioeconomic History    Marital status:    Tobacco Use    Smoking status: Never    Smokeless tobacco: Never   Substance and Sexual Activity    Alcohol use: No    Drug use: No       Family History:  Family History   Problem Relation Age of Onset    ADD / ADHD Father     Anxiety disorder Father     Depression Father     Anxiety disorder Maternal Grandfather     Depression Maternal Grandfather        Past Surgical History:  History reviewed. No pertinent surgical history.    Problem List:  There is no problem list on file for this patient.       Allergy:   Allergies   Allergen Reactions    Influenza Vaccine Live     Meningococcal And Conjugated Vaccines         Current Medications:   Current Outpatient Medications   Medication Sig Dispense Refill    amphetamine-dextroamphetamine XR (Adderall XR) 15 MG 24 hr capsule Take 1 capsule by mouth Daily 30 capsule 0     No current facility-administered medications for this visit.       Review of Symptoms:    Review of Systems   Psychiatric/Behavioral:  Positive for decreased concentration and stress. Negative for dysphoric mood, hallucinations, self-injury, sleep disturbance, suicidal ideas, negative for hyperactivity and depressed mood. The patient is not nervous/anxious.    All other systems reviewed and are negative.        Physical Exam:   There were no vitals taken for this visit.  There is no height or weight on file to calculate BMI.    10/18/23    MENTAL STATUS EXAM   General Appearance:  Cleanly groomed and dressed  Eye Contact:  Good eye contact  Attitude:  Cooperative  Motor Activity:   Normal gait, posture  Speech:  Normal rate, tone, volume  Language:  Spontaneous  Mood and affect:  Normal, pleasant  Hopelessness:  Denies  Thought Process:  Logical  Associations/ Thought Content:  No delusions  Hallucinations:  None  Suicidal Ideations:  Not present  Homicidal Ideation:  Not present  Sensorium:  Alert  Orientation:  Person, place, time and situation  Insight:  Fair  Judgement:  Fair  Reliability:  Fair  Impulse Control:  Fair      PHQ-Score Total:  PHQ-9 Total Score:        Lab Results:   No visits with results within 1 Month(s) from this visit.   Latest known visit with results is:   Office Visit on 02/28/2022   Component Date Value Ref Range Status    External Amphetamine Screen Urine 02/28/2022 Negative   Final    Amphetamine Cut-Off 02/28/2022 1000ng/ml   Final    External Benzodiazepine Screen Uri* 02/28/2022 Negative   Final    Benzodiazipine Cut-Off 02/28/2022 300ng/ml   Final    External Cocaine Screen Urine 02/28/2022 Negative   Final    Cocaine Cut-Off 02/28/2022 300ng/ml   Final    External THC Screen Urine 02/28/2022 Negative   Final    THC Cut-Off 02/28/2022 50ng/ml   Final    External Methadone Screen Urine 02/28/2022 Negative   Final    Methadone Cut-Off 02/28/2022 300ng/ml   Final    External Methamphetamine Screen Ur* 02/28/2022 Negative   Final    Methamphetamine Cut-Off 02/28/2022 1000ng/ml   Final    External Oxycodone Screen Urine 02/28/2022 Negative   Final    Oxycodone Cut-Off 02/28/2022 100ng/ml   Final    External Buprenorphine Screen Urine 02/28/2022 Negative   Final    Buprenorphine Cut-Off 02/28/2022 10ng/ml   Final    External MDMA 02/28/2022 Negative   Final    MDMA Cut-Off 02/28/2022 500ng/mln   Final    External Opiates Screen Urine 02/28/2022 Negative   Final    Opiates Cut-Off 02/28/2022 300ng/ml   Final       Assessment & Plan   Problems Addressed this Visit    None  Visit Diagnoses       Attention deficit hyperactivity disorder, combined type    -  Primary     Relevant Medications    amphetamine-dextroamphetamine XR (Adderall XR) 15 MG 24 hr capsule    Medication management              Diagnoses         Codes Comments    Attention deficit hyperactivity disorder, combined type    -  Primary ICD-10-CM: F90.2  ICD-9-CM: 314.01     Medication management     ICD-10-CM: Z79.899  ICD-9-CM: V58.69           Social History     Tobacco Use   Smoking Status Never   Smokeless Tobacco Never     JERRY reviewed and appropriate. Patient counseled on use of controlled substances.     -The benefits of a healthy diet and exercise were discussed with patient, especially the positive effects they have on mental health. Patient encouraged to consider lifestyle modification regarding  diet and exercise patterns to maximize results of mental health treatment.  -Reviewed previous available documentation  -Reviewed most recent available labs     -The patient is being prescribed a controlled substance as part of the treatment plan. The patient has been educated of appropriate use of the medication, including risks and side effects such as somnolence, limited ability to drive and/or work or function safely, potential for dependence, respiratory depression, falls, change in blood pressure, changes in heart rhythm or heart rate, activation of other mental illnesses, and overdose among others. Patient is also informed that the medication is to be used by the patient only, and to avoid any combined use of ETOH, or other substances, with this medication unless prescribed and as directed by a Provider.  The patient verbalized understanding and agreement with this in their own words.    -Prior medications include Concerta, and Vyvanse, (didn't tolerate, caused aggression)    Visit Diagnoses:    ICD-10-CM ICD-9-CM   1. Attention deficit hyperactivity disorder, combined type  F90.2 314.01   2. Medication management  Z79.899 V58.69         TREATMENT PLAN/GOALS: Continue supportive psychotherapy efforts and  medications as indicated. Treatment and medication options discussed during today's visit. Patient acknowledged and verbally consented to continue with current treatment plan and was educated on the importance of compliance with treatment and follow-up appointments.    MEDICATION ISSUES:    Discussed medication options and treatment plan of prescribed medication as well as the risks, benefits, and side effects including potential falls, possible impaired driving and metabolic adversities among others. Patient is agreeable to call the office with any worsening of symptoms or onset of side effects. Patient is agreeable to call 911 or go to the nearest ER should he/she begin having SI/HI.     MEDS ORDERED DURING VISIT:  New Medications Ordered This Visit   Medications    amphetamine-dextroamphetamine XR (Adderall XR) 15 MG 24 hr capsule     Sig: Take 1 capsule by mouth Daily     Dispense:  30 capsule     Refill:  0     -Continue Adderall XR 15 mg tablet, take 1 tablet every morning.     Return in about 3 months (around 1/18/2024).     I spent 30 minutes caring for Ben on this date of service. This time includes time spent by me in the following activities: preparing for the visit, obtaining and/or reviewing a separately obtained history, performing a medically appropriate examination and/or evaluation, counseling and educating the patient/family/caregiver, ordering medications, tests, or procedures, and documenting information in the medical record                 This document has been electronically signed by PRUDENCIO Rodriguez  October 18, 2023 14:36 EDT    Part of this note may be an electronic transcription/translation of spoken language to printed text using the Dragon Dictation System.

## 2023-10-18 ENCOUNTER — TELEMEDICINE (OUTPATIENT)
Dept: PSYCHIATRY | Facility: CLINIC | Age: 28
End: 2023-10-18
Payer: COMMERCIAL

## 2023-10-18 DIAGNOSIS — Z79.899 MEDICATION MANAGEMENT: ICD-10-CM

## 2023-10-18 DIAGNOSIS — F90.2 ATTENTION DEFICIT HYPERACTIVITY DISORDER, COMBINED TYPE: Primary | ICD-10-CM

## 2023-10-18 RX ORDER — DEXTROAMPHETAMINE SACCHARATE, AMPHETAMINE ASPARTATE MONOHYDRATE, DEXTROAMPHETAMINE SULFATE AND AMPHETAMINE SULFATE 3.75; 3.75; 3.75; 3.75 MG/1; MG/1; MG/1; MG/1
15 CAPSULE, EXTENDED RELEASE ORAL DAILY
Qty: 30 CAPSULE | Refills: 0 | Status: SHIPPED | OUTPATIENT
Start: 2023-10-18 | End: 2024-10-17

## 2023-11-03 DIAGNOSIS — F90.2 ATTENTION DEFICIT HYPERACTIVITY DISORDER, COMBINED TYPE: ICD-10-CM

## 2023-11-06 RX ORDER — DEXTROAMPHETAMINE SACCHARATE, AMPHETAMINE ASPARTATE MONOHYDRATE, DEXTROAMPHETAMINE SULFATE AND AMPHETAMINE SULFATE 3.75; 3.75; 3.75; 3.75 MG/1; MG/1; MG/1; MG/1
15 CAPSULE, EXTENDED RELEASE ORAL DAILY
Qty: 30 CAPSULE | Refills: 0 | Status: SHIPPED | OUTPATIENT
Start: 2023-11-06 | End: 2024-11-05

## 2023-12-09 DIAGNOSIS — F90.2 ATTENTION DEFICIT HYPERACTIVITY DISORDER, COMBINED TYPE: ICD-10-CM

## 2023-12-11 RX ORDER — DEXTROAMPHETAMINE SACCHARATE, AMPHETAMINE ASPARTATE MONOHYDRATE, DEXTROAMPHETAMINE SULFATE AND AMPHETAMINE SULFATE 3.75; 3.75; 3.75; 3.75 MG/1; MG/1; MG/1; MG/1
15 CAPSULE, EXTENDED RELEASE ORAL DAILY
Qty: 30 CAPSULE | Refills: 0 | Status: SHIPPED | OUTPATIENT
Start: 2023-12-11 | End: 2024-12-10

## 2024-01-13 DIAGNOSIS — F90.2 ATTENTION DEFICIT HYPERACTIVITY DISORDER, COMBINED TYPE: ICD-10-CM

## 2024-01-15 RX ORDER — DEXTROAMPHETAMINE SACCHARATE, AMPHETAMINE ASPARTATE MONOHYDRATE, DEXTROAMPHETAMINE SULFATE AND AMPHETAMINE SULFATE 3.75; 3.75; 3.75; 3.75 MG/1; MG/1; MG/1; MG/1
15 CAPSULE, EXTENDED RELEASE ORAL DAILY
Qty: 30 CAPSULE | Refills: 0 | Status: SHIPPED | OUTPATIENT
Start: 2024-01-15 | End: 2025-01-14

## 2024-01-22 NOTE — PROGRESS NOTES
This provider is located at the [facility] (through [facility]), [address], [place], [address] using a secure MyChart Video Visit through [employer]. Patient is being seen remotely via telehealth at their home address in [place], and stated they are in a secure environment for this session. The patient's condition being diagnosed/treated is appropriate for telemedicine. The provider identified herself as well as her credentials.   The patient, and/or patients guardian, consent to be seen remotely, and when consent is given they understand that the consent allows for patient identifiable information to be sent to a third party as needed.   They may refuse to be seen remotely at any time. The electronic data is encrypted and password protected, and the patient and/or guardian has been advised of the potential risks to privacy not withstanding such measures.    Dez Florez is a 28 y.o. male who presents today for follow up    Chief Complaint:  ADHD    History of Present Illness:   History of Present Illness  Today is a follow-up visit.     Medication compliance: patient is compliant with medications, denies SE.  Depression rated 0/10, with 10 being the worst.  Anxiety rated 0/10, with 10 being the worst.  ADHD rated 1/10, with 10 being the worst.  Sleep is good, getting about 7 hours a night,  Nightmares: Denies  Appetite is good.  Hallucinations: Patient denies auditory hallucinations and visual hallucinations  Self-harm: Patient denies thoughts of self-harm.  Alcohol use: no  Drug use: no  Marijuana use: no     Chronic health issues, no acute physical or medical issues today.    Details: he states things are continuing to go well. He continues to work at [employer], it is going well.     The following portions of the patient's history were reviewed and updated as appropriate: allergies, current medications, past family history, past medical  history, past social history, past surgical history and problem list.      Past Medical History:  Past Medical History:   Diagnosis Date    ADHD (attention deficit hyperactivity disorder)     Guillain-Springfield syndrome        Social History:  Social History     Socioeconomic History    Marital status:    Tobacco Use    Smoking status: Never    Smokeless tobacco: Never   Substance and Sexual Activity    Alcohol use: No    Drug use: No       Family History:  Family History   Problem Relation Age of Onset    ADD / ADHD Father     Anxiety disorder Father     Depression Father     Anxiety disorder Maternal Grandfather     Depression Maternal Grandfather        Past Surgical History:  History reviewed. No pertinent surgical history.    Problem List:  There is no problem list on file for this patient.       Allergy:   Allergies   Allergen Reactions    Influenza Vaccine Live     Meningococcal And Conjugated Vaccines         Current Medications:   Current Outpatient Medications   Medication Sig Dispense Refill    amphetamine-dextroamphetamine XR (Adderall XR) 15 MG 24 hr capsule Take 1 capsule by mouth Daily 30 capsule 0     No current facility-administered medications for this visit.       Review of Symptoms:    Review of Systems   Psychiatric/Behavioral:  Negative for dysphoric mood, hallucinations, self-injury, sleep disturbance, suicidal ideas, depressed mood and stress. The patient is nervous/anxious.    All other systems reviewed and are negative.        Physical Exam:   There were no vitals taken for this visit.  There is no height or weight on file to calculate BMI.    1/26/24    MENTAL STATUS EXAM   General Appearance:  Cleanly groomed and dressed  Eye Contact:  Good eye contact  Attitude:  Cooperative  Motor Activity:  Normal gait, posture  Speech:  Normal rate, tone, volume  Language:  Spontaneous  Mood and affect:  Normal, pleasant  Hopelessness:  Denies  Thought Process:  Goal-directed and  linear  Associations/ Thought Content:  No delusions  Hallucinations:  None  Suicidal Ideations:  Not present  Homicidal Ideation:  Not present  Sensorium:  Alert  Orientation:  Person, place, time and situation  Insight:  Fair  Judgement:  Fair  Reliability:  Fair  Impulse Control:  Fair      PHQ-Score Total:  PHQ-9 Total Score: (P) 0      Lab Results:   No visits with results within 1 Month(s) from this visit.   Latest known visit with results is:   Office Visit on 02/28/2022   Component Date Value Ref Range Status    External Amphetamine Screen Urine 02/28/2022 Negative   Final    Amphetamine Cut-Off 02/28/2022 1000ng/ml   Final    External Benzodiazepine Screen Uri* 02/28/2022 Negative   Final    Benzodiazipine Cut-Off 02/28/2022 300ng/ml   Final    External Cocaine Screen Urine 02/28/2022 Negative   Final    Cocaine Cut-Off 02/28/2022 300ng/ml   Final    External THC Screen Urine 02/28/2022 Negative   Final    THC Cut-Off 02/28/2022 50ng/ml   Final    External Methadone Screen Urine 02/28/2022 Negative   Final    Methadone Cut-Off 02/28/2022 300ng/ml   Final    External Methamphetamine Screen Ur* 02/28/2022 Negative   Final    Methamphetamine Cut-Off 02/28/2022 1000ng/ml   Final    External Oxycodone Screen Urine 02/28/2022 Negative   Final    Oxycodone Cut-Off 02/28/2022 100ng/ml   Final    External Buprenorphine Screen Urine 02/28/2022 Negative   Final    Buprenorphine Cut-Off 02/28/2022 10ng/ml   Final    External MDMA 02/28/2022 Negative   Final    MDMA Cut-Off 02/28/2022 500ng/mln   Final    External Opiates Screen Urine 02/28/2022 Negative   Final    Opiates Cut-Off 02/28/2022 300ng/ml   Final       Assessment & Plan   Problems Addressed this Visit    None  Visit Diagnoses       Attention deficit hyperactivity disorder, combined type    -  Primary    Relevant Medications    amphetamine-dextroamphetamine XR (Adderall XR) 15 MG 24 hr capsule    Medication management              Diagnoses         Codes  Comments    Attention deficit hyperactivity disorder, combined type    -  Primary ICD-10-CM: F90.2  ICD-9-CM: 314.01     Medication management     ICD-10-CM: Z79.899  ICD-9-CM: V58.69           Social History     Tobacco Use   Smoking Status Never   Smokeless Tobacco Never     JERRY reviewed and appropriate. Patient counseled on use of controlled substances.     -The benefits of a healthy diet and exercise were discussed with patient, especially the positive effects they have on mental health. Patient encouraged to consider lifestyle modification regarding  diet and exercise patterns to maximize results of mental health treatment.  -Reviewed previous available documentation  -Reviewed most recent available labs     -The patient is being prescribed a controlled substance as part of the treatment plan. The patient has been educated of appropriate use of the medication, including risks and side effects such as somnolence, limited ability to drive and/or work or function safely, potential for dependence, respiratory depression, falls, change in blood pressure, changes in heart rhythm or heart rate, activation of other mental illnesses, and overdose among others. Patient is also informed that the medication is to be used by the patient only, and to avoid any combined use of ETOH, or other substances, with this medication unless prescribed and as directed by a Provider.  The patient verbalized understanding and agreement with this in their own words.    -Prior medications include Concerta, and Vyvanse, (didn't tolerate, caused aggression)    Visit Diagnoses:    ICD-10-CM ICD-9-CM   1. Attention deficit hyperactivity disorder, combined type  F90.2 314.01   2. Medication management  Z79.899 V58.69           TREATMENT PLAN/GOALS: Continue supportive psychotherapy efforts and medications as indicated. Treatment and medication options discussed during today's visit. Patient acknowledged and verbally consented to continue with  current treatment plan and was educated on the importance of compliance with treatment and follow-up appointments.    MEDICATION ISSUES:    Discussed medication options and treatment plan of prescribed medication as well as the risks, benefits, and side effects including potential falls, possible impaired driving and metabolic adversities among others. Patient is agreeable to call the office with any worsening of symptoms or onset of side effects. Patient is agreeable to call 911 or go to the nearest ER should he/she begin having SI/HI.     MEDS ORDERED DURING VISIT:  New Medications Ordered This Visit   Medications    amphetamine-dextroamphetamine XR (Adderall XR) 15 MG 24 hr capsule     Sig: Take 1 capsule by mouth Daily     Dispense:  30 capsule     Refill:  0     -Continue Adderall XR 15 mg tablet, take 1 tablet every morning.     Return in about 3 months (around 4/26/2024).     I spent 30 minutes caring for Ben on this date of service. This time includes time spent by me in the following activities: preparing for the visit, obtaining and/or reviewing a separately obtained history, performing a medically appropriate examination and/or evaluation, counseling and educating the patient/family/caregiver, ordering medications, tests, or procedures, and documenting information in the medical record                   This document has been electronically signed by PRUDENCIO Rodriguez  January 26, 2024 10:44 EST    Part of this note may be an electronic transcription/translation of spoken language to printed text using the Dragon Dictation System.

## 2024-01-26 ENCOUNTER — TELEMEDICINE (OUTPATIENT)
Dept: PSYCHIATRY | Facility: CLINIC | Age: 29
End: 2024-01-26
Payer: COMMERCIAL

## 2024-01-26 DIAGNOSIS — Z79.899 MEDICATION MANAGEMENT: ICD-10-CM

## 2024-01-26 DIAGNOSIS — F90.2 ATTENTION DEFICIT HYPERACTIVITY DISORDER, COMBINED TYPE: Primary | ICD-10-CM

## 2024-01-26 RX ORDER — DEXTROAMPHETAMINE SACCHARATE, AMPHETAMINE ASPARTATE MONOHYDRATE, DEXTROAMPHETAMINE SULFATE AND AMPHETAMINE SULFATE 3.75; 3.75; 3.75; 3.75 MG/1; MG/1; MG/1; MG/1
15 CAPSULE, EXTENDED RELEASE ORAL DAILY
Qty: 30 CAPSULE | Refills: 0 | Status: SHIPPED | OUTPATIENT
Start: 2024-01-26 | End: 2025-01-25

## 2024-03-27 DIAGNOSIS — F90.2 ATTENTION DEFICIT HYPERACTIVITY DISORDER, COMBINED TYPE: ICD-10-CM

## 2024-03-27 RX ORDER — DEXTROAMPHETAMINE SACCHARATE, AMPHETAMINE ASPARTATE MONOHYDRATE, DEXTROAMPHETAMINE SULFATE AND AMPHETAMINE SULFATE 3.75; 3.75; 3.75; 3.75 MG/1; MG/1; MG/1; MG/1
15 CAPSULE, EXTENDED RELEASE ORAL DAILY
Qty: 30 CAPSULE | Refills: 0 | Status: SHIPPED | OUTPATIENT
Start: 2024-03-27 | End: 2025-03-27

## 2024-04-26 ENCOUNTER — TELEMEDICINE (OUTPATIENT)
Dept: PSYCHIATRY | Facility: CLINIC | Age: 29
End: 2024-04-26
Payer: COMMERCIAL

## 2024-04-26 DIAGNOSIS — Z79.899 MEDICATION MANAGEMENT: ICD-10-CM

## 2024-04-26 DIAGNOSIS — F90.2 ATTENTION DEFICIT HYPERACTIVITY DISORDER, COMBINED TYPE: Primary | ICD-10-CM

## 2024-04-26 RX ORDER — DEXTROAMPHETAMINE SACCHARATE, AMPHETAMINE ASPARTATE MONOHYDRATE, DEXTROAMPHETAMINE SULFATE AND AMPHETAMINE SULFATE 3.75; 3.75; 3.75; 3.75 MG/1; MG/1; MG/1; MG/1
15 CAPSULE, EXTENDED RELEASE ORAL DAILY
Qty: 30 CAPSULE | Refills: 0 | Status: SHIPPED | OUTPATIENT
Start: 2024-04-26 | End: 2025-04-26

## 2024-04-26 NOTE — PROGRESS NOTES
This provider is located at the [facility] (through [facility]), [address], [place], [address] using a secure MyChart Video Visit through [employer]. Patient is being seen remotely via telehealth at their home address in [place], and stated they are in a secure environment for this session. The patient's condition being diagnosed/treated is appropriate for telemedicine. The provider identified herself as well as her credentials.   The patient, and/or patients guardian, consent to be seen remotely, and when consent is given they understand that the consent allows for patient identifiable information to be sent to a third party as needed.   They may refuse to be seen remotely at any time. The electronic data is encrypted and password protected, and the patient and/or guardian has been advised of the potential risks to privacy not withstanding such measures.    Dez Florez is a 28 y.o. male who presents today for follow up    Chief Complaint:  ADHD    History of Present Illness:   History of Present Illness  Today is a follow-up visit.     Medication compliance: patient is compliant with medications, denies SE.  Depression rated 0/10, with 10 being the worst.  Anxiety rated 0/10, with 10 being the worst.  ADHD rated 2/10, with 10 being the worst.  Sleep is good, getting about 6 hours a night,  Nightmares: Denies  Appetite is good.  Hallucinations: Patient denies auditory hallucinations and visual hallucinations  Self-harm: Patient denies thoughts of self-harm.  Alcohol use: no  Drug use: no  Marijuana use: no     Chronic health issues, no acute physical or medical issues today.    Details: He continues to work at [employer], it is going well.  He states current medications are working well.       The following portions of the patient's history were reviewed and updated as appropriate: allergies, current medications, past family history, past  medical history, past social history, past surgical history and problem list.      Past Medical History:  Past Medical History:   Diagnosis Date    ADHD (attention deficit hyperactivity disorder)     Guillain-Rice syndrome        Social History:  Social History     Socioeconomic History    Marital status:    Tobacco Use    Smoking status: Never    Smokeless tobacco: Never   Substance and Sexual Activity    Alcohol use: No    Drug use: No       Family History:  Family History   Problem Relation Age of Onset    ADD / ADHD Father     Anxiety disorder Father     Depression Father     Anxiety disorder Maternal Grandfather     Depression Maternal Grandfather        Past Surgical History:  History reviewed. No pertinent surgical history.    Problem List:  There is no problem list on file for this patient.       Allergy:   Allergies   Allergen Reactions    Influenza Vaccine Live     Meningococcal And Conjugated Vaccines         Current Medications:   Current Outpatient Medications   Medication Sig Dispense Refill    amphetamine-dextroamphetamine XR (Adderall XR) 15 MG 24 hr capsule Take 1 capsule by mouth Daily 30 capsule 0     No current facility-administered medications for this visit.       Review of Symptoms:    Review of Systems   Psychiatric/Behavioral:  Positive for decreased concentration and sleep disturbance. Negative for hallucinations, self-injury, suicidal ideas, depressed mood and stress. The patient is nervous/anxious.    All other systems reviewed and are negative.        Physical Exam:   There were no vitals taken for this visit.  There is no height or weight on file to calculate BMI.    4/26/24    MENTAL STATUS EXAM   General Appearance:  Cleanly groomed and dressed  Eye Contact:  Good eye contact  Attitude:  Cooperative  Motor Activity:  Normal gait, posture  Speech:  Normal rate, tone, volume  Language:  Spontaneous  Mood and affect:  Normal, pleasant  Hopelessness:  Denies  Thought Process:   Goal-directed and linear  Associations/ Thought Content:  No delusions  Hallucinations:  None  Suicidal Ideations:  Not present  Homicidal Ideation:  Not present  Sensorium:  Alert  Orientation:  Person, place, time and situation  Insight:  Fair  Judgement:  Fair  Reliability:  Fair  Impulse Control:  Fair      PHQ-Score Total:  PHQ-9 Total Score: (P) 4      Lab Results:   No visits with results within 1 Month(s) from this visit.   Latest known visit with results is:   Office Visit on 02/28/2022   Component Date Value Ref Range Status    External Amphetamine Screen Urine 02/28/2022 Negative   Final    Amphetamine Cut-Off 02/28/2022 1000ng/ml   Final    External Benzodiazepine Screen Uri* 02/28/2022 Negative   Final    Benzodiazipine Cut-Off 02/28/2022 300ng/ml   Final    External Cocaine Screen Urine 02/28/2022 Negative   Final    Cocaine Cut-Off 02/28/2022 300ng/ml   Final    External THC Screen Urine 02/28/2022 Negative   Final    THC Cut-Off 02/28/2022 50ng/ml   Final    External Methadone Screen Urine 02/28/2022 Negative   Final    Methadone Cut-Off 02/28/2022 300ng/ml   Final    External Methamphetamine Screen Ur* 02/28/2022 Negative   Final    Methamphetamine Cut-Off 02/28/2022 1000ng/ml   Final    External Oxycodone Screen Urine 02/28/2022 Negative   Final    Oxycodone Cut-Off 02/28/2022 100ng/ml   Final    External Buprenorphine Screen Urine 02/28/2022 Negative   Final    Buprenorphine Cut-Off 02/28/2022 10ng/ml   Final    External MDMA 02/28/2022 Negative   Final    MDMA Cut-Off 02/28/2022 500ng/mln   Final    External Opiates Screen Urine 02/28/2022 Negative   Final    Opiates Cut-Off 02/28/2022 300ng/ml   Final       Assessment & Plan   Problems Addressed this Visit    None  Visit Diagnoses       Attention deficit hyperactivity disorder, combined type    -  Primary    Relevant Medications    amphetamine-dextroamphetamine XR (Adderall XR) 15 MG 24 hr capsule    Medication management              Diagnoses          Codes Comments    Attention deficit hyperactivity disorder, combined type    -  Primary ICD-10-CM: F90.2  ICD-9-CM: 314.01     Medication management     ICD-10-CM: Z79.899  ICD-9-CM: V58.69           Social History     Tobacco Use   Smoking Status Never   Smokeless Tobacco Never     JERRY reviewed and appropriate. Patient counseled on use of controlled substances.     -The benefits of a healthy diet and exercise were discussed with patient, especially the positive effects they have on mental health. Patient encouraged to consider lifestyle modification regarding  diet and exercise patterns to maximize results of mental health treatment.  -Reviewed previous available documentation  -Reviewed most recent available labs     -The patient is being prescribed a controlled substance as part of the treatment plan. The patient has been educated of appropriate use of the medication, including risks and side effects such as somnolence, limited ability to drive and/or work or function safely, potential for dependence, respiratory depression, falls, change in blood pressure, changes in heart rhythm or heart rate, activation of other mental illnesses, and overdose among others. Patient is also informed that the medication is to be used by the patient only, and to avoid any combined use of ETOH, or other substances, with this medication unless prescribed and as directed by a Provider.  The patient verbalized understanding and agreement with this in their own words.    -Prior medications include Concerta, and Vyvanse, (didn't tolerate, caused aggression)    Visit Diagnoses:    ICD-10-CM ICD-9-CM   1. Attention deficit hyperactivity disorder, combined type  F90.2 314.01   2. Medication management  Z79.899 V58.69         TREATMENT PLAN/GOALS: Continue supportive psychotherapy efforts and medications as indicated. Treatment and medication options discussed during today's visit. Patient acknowledged and verbally consented to  continue with current treatment plan and was educated on the importance of compliance with treatment and follow-up appointments.    MEDICATION ISSUES:    Discussed medication options and treatment plan of prescribed medication as well as the risks, benefits, and side effects including potential falls, possible impaired driving and metabolic adversities among others. Patient is agreeable to call the office with any worsening of symptoms or onset of side effects. Patient is agreeable to call 911 or go to the nearest ER should he/she begin having SI/HI.     MEDS ORDERED DURING VISIT:  New Medications Ordered This Visit   Medications    amphetamine-dextroamphetamine XR (Adderall XR) 15 MG 24 hr capsule     Sig: Take 1 capsule by mouth Daily     Dispense:  30 capsule     Refill:  0     -Continue Adderall XR 15 mg tablet, take 1 tablet every morning.     Return in about 3 months (around 7/26/2024).     I spent 30 minutes caring for Ben on this date of service. This time includes time spent by me in the following activities: preparing for the visit, obtaining and/or reviewing a separately obtained history, performing a medically appropriate examination and/or evaluation, counseling and educating the patient/family/caregiver, ordering medications, tests, or procedures, and documenting information in the medical record                     This document has been electronically signed by PRUDENCIO Rodriguez  April 26, 2024 10:44 EDT    Part of this note may be an electronic transcription/translation of spoken language to printed text using the Dragon Dictation System.

## 2024-05-30 DIAGNOSIS — F90.2 ATTENTION DEFICIT HYPERACTIVITY DISORDER, COMBINED TYPE: ICD-10-CM

## 2024-05-30 RX ORDER — DEXTROAMPHETAMINE SACCHARATE, AMPHETAMINE ASPARTATE MONOHYDRATE, DEXTROAMPHETAMINE SULFATE AND AMPHETAMINE SULFATE 3.75; 3.75; 3.75; 3.75 MG/1; MG/1; MG/1; MG/1
15 CAPSULE, EXTENDED RELEASE ORAL DAILY
Qty: 30 CAPSULE | Refills: 0 | Status: SHIPPED | OUTPATIENT
Start: 2024-05-30 | End: 2025-05-30

## 2024-07-09 DIAGNOSIS — F90.2 ATTENTION DEFICIT HYPERACTIVITY DISORDER, COMBINED TYPE: ICD-10-CM

## 2024-07-09 RX ORDER — DEXTROAMPHETAMINE SACCHARATE, AMPHETAMINE ASPARTATE MONOHYDRATE, DEXTROAMPHETAMINE SULFATE AND AMPHETAMINE SULFATE 3.75; 3.75; 3.75; 3.75 MG/1; MG/1; MG/1; MG/1
15 CAPSULE, EXTENDED RELEASE ORAL DAILY
Qty: 30 CAPSULE | Refills: 0 | Status: SHIPPED | OUTPATIENT
Start: 2024-07-09 | End: 2025-07-09

## 2024-08-07 NOTE — PROGRESS NOTES
This provider is located at the [facility] (through [facility]), [address], [place], [address] using a secure MyChart Video Visit through [employer]. Patient is being seen remotely via telehealth at their home address in [place], and stated they are in a secure environment for this session. The patient's condition being diagnosed/treated is appropriate for telemedicine. The provider identified herself as well as her credentials.   The patient, and/or patients guardian, consent to be seen remotely, and when consent is given they understand that the consent allows for patient identifiable information to be sent to a third party as needed.   They may refuse to be seen remotely at any time. The electronic data is encrypted and password protected, and the patient and/or guardian has been advised of the potential risks to privacy not withstanding such measures.    Dez Flroez is a 29 y.o. male who presents today for follow up    Chief Complaint:  ADHD    History of Present Illness:   History of Present Illness  Today is a follow-up visit.     Medication compliance: patient is compliant with medications, denies SE.  Depression rated 5/10, with 10 being the worst.  Anxiety rated 6/10, with 10 being the worst.  ADHD rated 3/10, with 10 being the worst.  Sleep is fair, getting about 6 hours a night,  Nightmares: Occasional  Appetite is fair.  Hallucinations: Patient denies auditory hallucinations and visual hallucinations  Self-harm: Patient denies thoughts of self-harm.  Alcohol use: no  Drug use: no  Marijuana use: no     Chronic health issues, no acute physical or medical issues today.    Details: He continues to work at [employer], it is going ok.  His mother passed away about a month ago, father was severely injured, in MVA. He saw PCP, Carmen Dc at [facility], prescribed Lexapro 10 mg, 1/2 tablet x 1 week, then increase 1 tablet, and  hydroxyzine 25 mg at night for anxiety, he and his brother is helping care his father, alternating shifts. He returned to work a week ago. He doesn't want to remain on Lexapro and Hydroxyzine long term       The following portions of the patient's history were reviewed and updated as appropriate: allergies, current medications, past family history, past medical history, past social history, past surgical history and problem list.      Past Medical History:  Past Medical History:   Diagnosis Date    ADHD (attention deficit hyperactivity disorder)     Guillain-[place] syndrome        Social History:  Social History     Socioeconomic History    Marital status:    Tobacco Use    Smoking status: Never    Smokeless tobacco: Never   Substance and Sexual Activity    Alcohol use: No    Drug use: No       Family History:  Family History   Problem Relation Age of Onset    ADD / ADHD Father     Anxiety disorder Father     Depression Father     Anxiety disorder Maternal Grandfather     Depression Maternal Grandfather        Past Surgical History:  History reviewed. No pertinent surgical history.    Problem List:  There is no problem list on file for this patient.       Allergy:   Allergies   Allergen Reactions    Influenza Vaccine Live     Meningococcal And Conjugated Vaccines         Current Medications:   Current Outpatient Medications   Medication Sig Dispense Refill    amphetamine-dextroamphetamine XR (Adderall XR) 15 MG 24 hr capsule Take 1 capsule by mouth Daily 30 capsule 0    escitalopram (LEXAPRO) 10 MG tablet Take 1 tablet by mouth Daily.      hydrOXYzine (ATARAX) 25 MG tablet Take 1 tablet by mouth Every Night.       No current facility-administered medications for this visit.       Review of Symptoms:    Review of Systems   Psychiatric/Behavioral:  Positive for decreased concentration, sleep disturbance, depressed mood and stress. Negative for dysphoric mood, hallucinations, self-injury and suicidal ideas. The  patient is nervous/anxious.    All other systems reviewed and are negative.      Physical Exam:   There were no vitals taken for this visit.  There is no height or weight on file to calculate BMI.    08/09/24    MENTAL STATUS EXAM   General Appearance:  Cleanly groomed and dressed  Eye Contact:  Good eye contact  Attitude:  Cooperative  Motor Activity:  Normal gait, posture  Speech:  Normal rate, tone, volume  Language:  Spontaneous  Mood and affect:  Normal, pleasant  Hopelessness:  Denies  Thought Process:  Goal-directed and linear  Associations/ Thought Content:  No delusions  Hallucinations:  None  Suicidal Ideations:  Not present  Homicidal Ideation:  Not present  Sensorium:  Alert  Orientation:  Person, place, time and situation  Insight:  Fair  Judgement:  Fair  Reliability:  Fair  Impulse Control:  Fair      PHQ-Score Total:  PHQ-9 Total Score: (P) 19      Lab Results:   No visits with results within 1 Month(s) from this visit.   Latest known visit with results is:   Office Visit on 02/28/2022   Component Date Value Ref Range Status    External Amphetamine Screen Urine 02/28/2022 Negative   Final    Amphetamine Cut-Off 02/28/2022 1000ng/ml   Final    External Benzodiazepine Screen Uri* 02/28/2022 Negative   Final    Benzodiazipine Cut-Off 02/28/2022 300ng/ml   Final    External Cocaine Screen Urine 02/28/2022 Negative   Final    Cocaine Cut-Off 02/28/2022 300ng/ml   Final    External THC Screen Urine 02/28/2022 Negative   Final    THC Cut-Off 02/28/2022 50ng/ml   Final    External Methadone Screen Urine 02/28/2022 Negative   Final    Methadone Cut-Off 02/28/2022 300ng/ml   Final    External Methamphetamine Screen Ur* 02/28/2022 Negative   Final    Methamphetamine Cut-Off 02/28/2022 1000ng/ml   Final    External Oxycodone Screen Urine 02/28/2022 Negative   Final    Oxycodone Cut-Off 02/28/2022 100ng/ml   Final    External Buprenorphine Screen Urine 02/28/2022 Negative   Final    Buprenorphine Cut-Off  02/28/2022 10ng/ml   Final    External MDMA 02/28/2022 Negative   Final    MDMA Cut-Off 02/28/2022 500ng/mln   Final    External Opiates Screen Urine 02/28/2022 Negative   Final    Opiates Cut-Off 02/28/2022 300ng/ml   Final       Assessment & Plan   Problems Addressed this Visit    None  Visit Diagnoses       Attention deficit hyperactivity disorder, combined type    -  Primary    Relevant Medications    hydrOXYzine (ATARAX) 25 MG tablet    escitalopram (LEXAPRO) 10 MG tablet    amphetamine-dextroamphetamine XR (Adderall XR) 15 MG 24 hr capsule    Medication management        Grief              Diagnoses         Codes Comments    Attention deficit hyperactivity disorder, combined type    -  Primary ICD-10-CM: F90.2  ICD-9-CM: 314.01     Medication management     ICD-10-CM: Z79.899  ICD-9-CM: V58.69     Grief     ICD-10-CM: F43.21  ICD-9-CM: 309.0           Social History     Tobacco Use   Smoking Status Never   Smokeless Tobacco Never     JERRY reviewed and appropriate. Patient counseled on use of controlled substances.     -The benefits of a healthy diet and exercise were discussed with patient, especially the positive effects they have on mental health. Patient encouraged to consider lifestyle modification regarding  diet and exercise patterns to maximize results of mental health treatment.  -Reviewed previous available documentation  -Reviewed most recent available labs     -The patient is being prescribed a controlled substance as part of the treatment plan. The patient has been educated of appropriate use of the medication, including risks and side effects such as somnolence, limited ability to drive and/or work or function safely, potential for dependence, respiratory depression, falls, change in blood pressure, changes in heart rhythm or heart rate, activation of other mental illnesses, and overdose among others. Patient is also informed that the medication is to be used by the patient only, and to avoid  any combined use of ETOH, or other substances, with this medication unless prescribed and as directed by a Provider.  The patient verbalized understanding and agreement with this in their own words.    -Prior medications include Concerta, and Vyvanse, (didn't tolerate, caused aggression)    Visit Diagnoses:    ICD-10-CM ICD-9-CM   1. Attention deficit hyperactivity disorder, combined type  F90.2 314.01   2. Medication management  Z79.899 V58.69   3. Grief  F43.21 309.0       TREATMENT PLAN/GOALS: Continue supportive psychotherapy efforts and medications as indicated. Treatment and medication options discussed during today's visit. Patient acknowledged and verbally consented to continue with current treatment plan and was educated on the importance of compliance with treatment and follow-up appointments.    MEDICATION ISSUES:    Discussed medication options and treatment plan of prescribed medication as well as the risks, benefits, and side effects including potential falls, possible impaired driving and metabolic adversities among others. Patient is agreeable to call the office with any worsening of symptoms or onset of side effects. Patient is agreeable to call 911 or go to the nearest ER should he/she begin having SI/HI.     MEDS ORDERED DURING VISIT:  New Medications Ordered This Visit   Medications    amphetamine-dextroamphetamine XR (Adderall XR) 15 MG 24 hr capsule     Sig: Take 1 capsule by mouth Daily     Dispense:  30 capsule     Refill:  0     -Continue Adderall XR 15 mg tablet, take 1 tablet every morning.     Return in about 3 months (around 11/9/2024).     I spent 30 minutes caring for Ben on this date of service. This time includes time spent by me in the following activities: preparing for the visit, performing a medically appropriate examination and/or evaluation, counseling and educating the patient/family/caregiver, documenting information in the medical record, and ordering  medications                     This document has been electronically signed by PRUDENCIO Rodriguez  August 9, 2024 12:08 EDT    Part of this note may be an electronic transcription/translation of spoken language to printed text using the Dragon Dictation System.

## 2024-08-09 ENCOUNTER — TELEMEDICINE (OUTPATIENT)
Dept: PSYCHIATRY | Facility: CLINIC | Age: 29
End: 2024-08-09
Payer: COMMERCIAL

## 2024-08-09 DIAGNOSIS — F43.21 GRIEF: ICD-10-CM

## 2024-08-09 DIAGNOSIS — Z79.899 MEDICATION MANAGEMENT: ICD-10-CM

## 2024-08-09 DIAGNOSIS — F90.2 ATTENTION DEFICIT HYPERACTIVITY DISORDER, COMBINED TYPE: Primary | ICD-10-CM

## 2024-08-09 RX ORDER — DEXTROAMPHETAMINE SACCHARATE, AMPHETAMINE ASPARTATE MONOHYDRATE, DEXTROAMPHETAMINE SULFATE AND AMPHETAMINE SULFATE 3.75; 3.75; 3.75; 3.75 MG/1; MG/1; MG/1; MG/1
15 CAPSULE, EXTENDED RELEASE ORAL DAILY
Qty: 30 CAPSULE | Refills: 0 | Status: SHIPPED | OUTPATIENT
Start: 2024-08-09 | End: 2025-08-09

## 2024-08-09 RX ORDER — ESCITALOPRAM OXALATE 10 MG/1
10 TABLET ORAL DAILY
COMMUNITY

## 2024-08-09 RX ORDER — HYDROXYZINE HYDROCHLORIDE 25 MG/1
25 TABLET, FILM COATED ORAL NIGHTLY
COMMUNITY

## 2024-09-20 DIAGNOSIS — F90.2 ATTENTION DEFICIT HYPERACTIVITY DISORDER, COMBINED TYPE: ICD-10-CM

## 2024-09-20 RX ORDER — DEXTROAMPHETAMINE SACCHARATE, AMPHETAMINE ASPARTATE MONOHYDRATE, DEXTROAMPHETAMINE SULFATE AND AMPHETAMINE SULFATE 3.75; 3.75; 3.75; 3.75 MG/1; MG/1; MG/1; MG/1
15 CAPSULE, EXTENDED RELEASE ORAL DAILY
Qty: 30 CAPSULE | Refills: 0 | Status: SHIPPED | OUTPATIENT
Start: 2024-09-20 | End: 2025-09-20

## 2024-10-28 DIAGNOSIS — F90.2 ATTENTION DEFICIT HYPERACTIVITY DISORDER, COMBINED TYPE: ICD-10-CM

## 2024-10-28 RX ORDER — DEXTROAMPHETAMINE SACCHARATE, AMPHETAMINE ASPARTATE MONOHYDRATE, DEXTROAMPHETAMINE SULFATE AND AMPHETAMINE SULFATE 3.75; 3.75; 3.75; 3.75 MG/1; MG/1; MG/1; MG/1
15 CAPSULE, EXTENDED RELEASE ORAL DAILY
Qty: 30 CAPSULE | Refills: 0 | Status: SHIPPED | OUTPATIENT
Start: 2024-10-28 | End: 2025-10-28

## 2024-11-20 NOTE — PROGRESS NOTES
This provider is located at the [facility]), [address], [place], [address] using a secure [employer] Video Visit through [employer] Patient is being seen remotely via telehealth at their home address in [place], and stated they are in a secure environment for this session. The patient's condition being diagnosed/treated is appropriate for telemedicine. The provider identified herself as well as her credentials.   The patient, and/or patients guardian, consent to be seen remotely, and when consent is given they understand that the consent allows for patient identifiable information to be sent to a third party as needed.   They may refuse to be seen remotely at any time. The electronic data is encrypted and password protected, and the patient and/or guardian has been advised of the potential risks to privacy not withstanding such measures.    Mode of Visit: Video  Location of patient: -HOME-  Location of provider: +[facility]+  You have chosen to receive care through a telehealth visit.  The patient has signed the video visit consent form.  The visit included audio and video interaction. No technical issues occurred during this visit.      Dez Florez is a 29 y.o. male who presents today for follow up    Chief Complaint:  ADHD    History of Present Illness:   History of Present Illness  Today is a follow-up visit.     Medication compliance: patient is compliant with medications, denies SE.  Depression rated 2/10, with 10 being the worst.  Anxiety rated 4/10, with 10 being the worst.  ADHD rated 3/10, with 10 being the worst.  Sleep is good, getting about 6 hours a night,  Nightmares: No  Appetite is good.  Hallucinations: Patient denies auditory hallucinations and visual hallucinations  Self-harm: Patient denies thoughts of self-harm.  Alcohol use: no  Drug use: no  Marijuana use: no     Chronic health issues, no acute physical or medical  issues today.    Details: He states he is dealing with his mother's death. He continues to work at [employer], it is going ok       The following portions of the patient's history were reviewed and updated as appropriate: allergies, current medications, past family history, past medical history, past social history, past surgical history and problem list.      Past Medical History:  Past Medical History:   Diagnosis Date    ADHD (attention deficit hyperactivity disorder)     Guillain-[place] syndrome        Social History:  Social History     Socioeconomic History    Marital status:    Tobacco Use    Smoking status: Never    Smokeless tobacco: Never   Substance and Sexual Activity    Alcohol use: No    Drug use: No       Family History:  Family History   Problem Relation Age of Onset    ADD / ADHD Father     Anxiety disorder Father     Depression Father     Anxiety disorder Maternal Grandfather     Depression Maternal Grandfather        Past Surgical History:  History reviewed. No pertinent surgical history.    Problem List:  There is no problem list on file for this patient.       Allergy:   Allergies   Allergen Reactions    Influenza Vaccine Live     Meningococcal And Conjugated Vaccines         Current Medications:   Current Outpatient Medications   Medication Sig Dispense Refill    amphetamine-dextroamphetamine XR (Adderall XR) 15 MG 24 hr capsule Take 1 capsule by mouth Daily 30 capsule 0    escitalopram (LEXAPRO) 10 MG tablet Take 1 tablet by mouth Daily.      hydrOXYzine (ATARAX) 25 MG tablet Take 1 tablet by mouth Every Night.       No current facility-administered medications for this visit.       Review of Symptoms:    Review of Systems   Psychiatric/Behavioral:  Positive for decreased concentration, sleep disturbance, depressed mood and stress. Negative for dysphoric mood, hallucinations, self-injury and suicidal ideas. The patient is nervous/anxious.    All other systems reviewed  and are negative.      Physical Exam:   There were no vitals taken for this visit.  There is no height or weight on file to calculate BMI.    11/22/24    MENTAL STATUS EXAM   General Appearance:  Cleanly groomed and dressed  Eye Contact:  Good eye contact  Attitude:  Cooperative  Motor Activity:  Normal gait, posture  Speech:  Normal rate, tone, volume  Language:  Spontaneous  Mood and affect:  Normal, pleasant  Hopelessness:  Denies  Thought Process:  Goal-directed and linear  Associations/ Thought Content:  No delusions  Hallucinations:  None  Suicidal Ideations:  Not present  Homicidal Ideation:  Not present  Sensorium:  Alert  Orientation:  Person, place, time and situation  Insight:  Fair  Judgement:  Fair  Reliability:  Fair  Impulse Control:  Fair      PHQ-9 Total Score: (Patient-Rptd) 0    Lab Results:   No visits with results within 1 Month(s) from this visit.   Latest known visit with results is:   Office Visit on 02/28/2022   Component Date Value Ref Range Status    External Amphetamine Screen Urine 02/28/2022 Negative   Final    Amphetamine Cut-Off 02/28/2022 1000ng/ml   Final    External Benzodiazepine Screen Uri* 02/28/2022 Negative   Final    Benzodiazipine Cut-Off 02/28/2022 300ng/ml   Final    External Cocaine Screen Urine 02/28/2022 Negative   Final    Cocaine Cut-Off 02/28/2022 300ng/ml   Final    External THC Screen Urine 02/28/2022 Negative   Final    THC Cut-Off 02/28/2022 50ng/ml   Final    External Methadone Screen Urine 02/28/2022 Negative   Final    Methadone Cut-Off 02/28/2022 300ng/ml   Final    External Methamphetamine Screen Ur* 02/28/2022 Negative   Final    Methamphetamine Cut-Off 02/28/2022 1000ng/ml   Final    External Oxycodone Screen Urine 02/28/2022 Negative   Final    Oxycodone Cut-Off 02/28/2022 100ng/ml   Final    External Buprenorphine Screen Urine 02/28/2022 Negative   Final    Buprenorphine Cut-Off 02/28/2022 10ng/ml   Final    External MDMA 02/28/2022 Negative   Final     MDMA Cut-Off 02/28/2022 500ng/mln   Final    External Opiates Screen Urine 02/28/2022 Negative   Final    Opiates Cut-Off 02/28/2022 300ng/ml   Final       Assessment & Plan   Problems Addressed this Visit    None  Visit Diagnoses       Attention deficit hyperactivity disorder, combined type    -  Primary    Relevant Medications    amphetamine-dextroamphetamine XR (Adderall XR) 15 MG 24 hr capsule    Grief        Medication management              Diagnoses         Codes Comments    Attention deficit hyperactivity disorder, combined type    -  Primary ICD-10-CM: F90.2  ICD-9-CM: 314.01     Grief     ICD-10-CM: F43.21  ICD-9-CM: 309.0     Medication management     ICD-10-CM: Z79.899  ICD-9-CM: V58.69           Social History     Tobacco Use   Smoking Status Never   Smokeless Tobacco Never     JERRY reviewed and appropriate. Patient counseled on use of controlled substances.     -The benefits of a healthy diet and exercise were discussed with patient, especially the positive effects they have on mental health. Patient encouraged to consider lifestyle modification regarding  diet and exercise patterns to maximize results of mental health treatment.  -Reviewed previous available documentation  -Reviewed most recent available labs     -The patient is being prescribed a controlled substance as part of the treatment plan. The patient has been educated of appropriate use of the medication, including risks and side effects such as somnolence, limited ability to drive and/or work or function safely, potential for dependence, respiratory depression, falls, change in blood pressure, changes in heart rhythm or heart rate, activation of other mental illnesses, and overdose among others. Patient is also informed that the medication is to be used by the patient only, and to avoid any combined use of ETOH, or other substances, with this medication unless prescribed and as directed by a Provider.  The patient verbalized  understanding and agreement with this in their own words.    -Prior medications include Concerta, and Vyvanse, (didn't tolerate, caused aggression)    Visit Diagnoses:    ICD-10-CM ICD-9-CM   1. Attention deficit hyperactivity disorder, combined type  F90.2 314.01   2. Grief  F43.21 309.0   3. Medication management  Z79.899 V58.69         TREATMENT PLAN/GOALS: Continue supportive psychotherapy efforts and medications as indicated. Treatment and medication options discussed during today's visit. Patient acknowledged and verbally consented to continue with current treatment plan and was educated on the importance of compliance with treatment and follow-up appointments.    MEDICATION ISSUES:    Discussed medication options and treatment plan of prescribed medication as well as the risks, benefits, and side effects including potential falls, possible impaired driving and metabolic adversities among others. Patient is agreeable to call the office with any worsening of symptoms or onset of side effects. Patient is agreeable to call 911 or go to the nearest ER should he/she begin having SI/HI.     MEDS ORDERED DURING VISIT:  New Medications Ordered This Visit   Medications    amphetamine-dextroamphetamine XR (Adderall XR) 15 MG 24 hr capsule     Sig: Take 1 capsule by mouth Daily     Dispense:  30 capsule     Refill:  0     -Continue Adderall XR 15 mg tablet, take 1 tablet every morning.     Return in about 3 months (around 2/22/2025).     I spent 30 minutes caring for Ben on this date of service. This time includes time spent by me in the following activities: preparing for the visit, performing a medically appropriate examination and/or evaluation, counseling and educating the patient/family/caregiver, documenting information in the medical record, and ordering medications         This document has been electronically signed by PRUDENCIO Rodriguez  November 22, 2024 09:27 EST    Part of this note may be an  electronic transcription/translation of spoken language to printed text using the Dragon Dictation System.

## 2024-11-22 ENCOUNTER — TELEMEDICINE (OUTPATIENT)
Dept: PSYCHIATRY | Facility: CLINIC | Age: 29
End: 2024-11-22
Payer: COMMERCIAL

## 2024-11-22 DIAGNOSIS — F90.2 ATTENTION DEFICIT HYPERACTIVITY DISORDER, COMBINED TYPE: Primary | ICD-10-CM

## 2024-11-22 DIAGNOSIS — Z79.899 MEDICATION MANAGEMENT: ICD-10-CM

## 2024-11-22 DIAGNOSIS — F43.21 GRIEF: ICD-10-CM

## 2024-11-22 RX ORDER — DEXTROAMPHETAMINE SACCHARATE, AMPHETAMINE ASPARTATE MONOHYDRATE, DEXTROAMPHETAMINE SULFATE AND AMPHETAMINE SULFATE 3.75; 3.75; 3.75; 3.75 MG/1; MG/1; MG/1; MG/1
15 CAPSULE, EXTENDED RELEASE ORAL DAILY
Qty: 30 CAPSULE | Refills: 0 | Status: SHIPPED | OUTPATIENT
Start: 2024-11-22 | End: 2025-11-22

## 2025-02-14 DIAGNOSIS — F90.2 ATTENTION DEFICIT HYPERACTIVITY DISORDER, COMBINED TYPE: ICD-10-CM

## 2025-02-14 RX ORDER — DEXTROAMPHETAMINE SACCHARATE, AMPHETAMINE ASPARTATE MONOHYDRATE, DEXTROAMPHETAMINE SULFATE AND AMPHETAMINE SULFATE 3.75; 3.75; 3.75; 3.75 MG/1; MG/1; MG/1; MG/1
15 CAPSULE, EXTENDED RELEASE ORAL DAILY
Qty: 30 CAPSULE | Refills: 0 | Status: SHIPPED | OUTPATIENT
Start: 2025-02-14 | End: 2026-02-14

## 2025-03-05 NOTE — PROGRESS NOTES
This provider is located at the [facility]through [facility]), [address], [place], [address] using a secure [employer] and [employer] Video Visit through [employer]. Patient is being seen remotely via telehealth at their home address in [place], and stated they are in a secure environment for this session. The patient's condition being diagnosed/treated is appropriate for telemedicine. The provider identified herself as well as her credentials.   The patient, and/or patients guardian, consent to be seen remotely, and when consent is given they understand that the consent allows for patient identifiable information to be sent to a third party as needed.   They may refuse to be seen remotely at any time. The electronic data is encrypted and password protected, and the patient and/or guardian has been advised of the potential risks to privacy not withstanding such measures.    Mode of Visit: Video  Location of patient: -HOME-  Location of provider: +[facility]+  You have chosen to receive care through a telehealth visit.  The patient has signed the video visit consent form.  The visit included audio and video interaction. No technical issues occurred during this visit.      Dez Florez is a 29 y.o. male who presents today for follow up    Chief Complaint:  ADHD    History of Present Illness:   History of Present Illness  Today is a follow-up visit.     Medication compliance: patient is compliant with medications, denies SE.  Depression rated 3/10, with 10 being the worst.  Anxiety rated 3/10, with 10 being the worst.  ADHD rated 3/10, with 10 being the worst.  Sleep is fair, getting about 6 hours a night,  Nightmares: Occasional  Appetite is good.  Hallucinations: Patient denies auditory hallucinations and visual hallucinations  Self-harm: Patient denies thoughts of self-harm.  Alcohol use: no  Drug use: no  Marijuana use: no     Chronic health issues, no acute physical or  medical issues today.    Details: He continues to work at [employer], it is going ok. He states he has been helping to care for his father. His spouse is expecting a baby. Reorts Adderall wears off after he gets home from work, but is effective while he is at work.      The following portions of the patient's history were reviewed and updated as appropriate: allergies, current medications, past family history, past medical history, past social history, past surgical history and problem list.      Past Medical History:  Past Medical History:   Diagnosis Date    ADHD (attention deficit hyperactivity disorder)     Guillain-[place] syndrome        Social History:  Social History     Socioeconomic History    Marital status:    Tobacco Use    Smoking status: Never    Smokeless tobacco: Never   Substance and Sexual Activity    Alcohol use: No    Drug use: No       Family History:  Family History   Problem Relation Age of Onset    ADD / ADHD Father     Anxiety disorder Father     Depression Father     Anxiety disorder Maternal Grandfather     Depression Maternal Grandfather        Past Surgical History:  History reviewed. No pertinent surgical history.    Problem List:  There is no problem list on file for this patient.       Allergy:   Allergies   Allergen Reactions    Influenza Vaccine Live     Meningococcal And Conjugated Vaccines         Current Medications:   Current Outpatient Medications   Medication Sig Dispense Refill    amphetamine-dextroamphetamine XR (Adderall XR) 15 MG 24 hr capsule Take 1 capsule by mouth Daily 30 capsule 0    escitalopram (LEXAPRO) 10 MG tablet Take 1 tablet by mouth Daily. (Patient not taking: Reported on 3/7/2025)      hydrOXYzine (ATARAX) 25 MG tablet Take 1 tablet by mouth Every Night. (Patient not taking: Reported on 3/7/2025)       No current facility-administered medications for this visit.       Review of Symptoms:    Review of Systems   Psychiatric/Behavioral:   Positive for decreased concentration, sleep disturbance, depressed mood and stress. Negative for dysphoric mood, hallucinations, self-injury and suicidal ideas. The patient is nervous/anxious.    All other systems reviewed and are negative.      Physical Exam:   There were no vitals taken for this visit.  There is no height or weight on file to calculate BMI.    03/07/25    MENTAL STATUS EXAM   General Appearance:  Cleanly groomed and dressed  Eye Contact:  Good eye contact  Attitude:  Cooperative  Motor Activity:  Normal gait, posture  Speech:  Normal rate, tone, volume  Language:  Spontaneous  Mood and affect:  Normal, pleasant  Hopelessness:  Denies  Thought Process:  Goal-directed and linear  Associations/ Thought Content:  No delusions  Hallucinations:  None  Suicidal Ideations:  Not present  Homicidal Ideation:  Not present  Sensorium:  Alert  Orientation:  Person, place, time and situation  Insight:  Fair  Judgement:  Fair  Reliability:  Fair  Impulse Control:  Fair      PHQ-2 Total Score: 1      Lab Results:   No visits with results within 1 Month(s) from this visit.   Latest known visit with results is:   Office Visit on 02/28/2022   Component Date Value Ref Range Status    External Amphetamine Screen Urine 02/28/2022 Negative   Final    Amphetamine Cut-Off 02/28/2022 1000ng/ml   Final    External Benzodiazepine Screen Uri* 02/28/2022 Negative   Final    Benzodiazipine Cut-Off 02/28/2022 300ng/ml   Final    External Cocaine Screen Urine 02/28/2022 Negative   Final    Cocaine Cut-Off 02/28/2022 300ng/ml   Final    External THC Screen Urine 02/28/2022 Negative   Final    THC Cut-Off 02/28/2022 50ng/ml   Final    External Methadone Screen Urine 02/28/2022 Negative   Final    Methadone Cut-Off 02/28/2022 300ng/ml   Final    External Methamphetamine Screen Ur* 02/28/2022 Negative   Final    Methamphetamine Cut-Off 02/28/2022 1000ng/ml   Final    External Oxycodone Screen Urine 02/28/2022 Negative   Final     Oxycodone Cut-Off 02/28/2022 100ng/ml   Final    External Buprenorphine Screen Urine 02/28/2022 Negative   Final    Buprenorphine Cut-Off 02/28/2022 10ng/ml   Final    External MDMA 02/28/2022 Negative   Final    MDMA Cut-Off 02/28/2022 500ng/mln   Final    External Opiates Screen Urine 02/28/2022 Negative   Final    Opiates Cut-Off 02/28/2022 300ng/ml   Final       Assessment & Plan   Problems Addressed this Visit    None  Visit Diagnoses       Attention deficit hyperactivity disorder, combined type    -  Primary    Relevant Medications    amphetamine-dextroamphetamine XR (Adderall XR) 15 MG 24 hr capsule    Grief        Medication management              Diagnoses         Codes Comments    Attention deficit hyperactivity disorder, combined type    -  Primary ICD-10-CM: F90.2  ICD-9-CM: 314.01     Grief     ICD-10-CM: F43.21  ICD-9-CM: 309.0     Medication management     ICD-10-CM: Z79.899  ICD-9-CM: V58.69           Social History     Tobacco Use   Smoking Status Never   Smokeless Tobacco Never     JERRY reviewed and appropriate. Patient counseled on use of controlled substances.     -The benefits of a healthy diet and exercise were discussed with patient, especially the positive effects they have on mental health. Patient encouraged to consider lifestyle modification regarding  diet and exercise patterns to maximize results of mental health treatment.  -Reviewed previous available documentation  -Reviewed most recent available labs     -The patient is being prescribed a controlled substance as part of the treatment plan. The patient has been educated of appropriate use of the medication, including risks and side effects such as somnolence, limited ability to drive and/or work or function safely, potential for dependence, respiratory depression, falls, change in blood pressure, changes in heart rhythm or heart rate, activation of other mental illnesses, and overdose among others. Patient is also informed that the  medication is to be used by the patient only, and to avoid any combined use of ETOH, or other substances, with this medication unless prescribed and as directed by a Provider.  The patient verbalized understanding and agreement with this in their own words.    -Prior medications include Lexapro, Hydroxyzine, Concerta, and Vyvanse, (didn't tolerate, caused aggression)    Visit Diagnoses:    ICD-10-CM ICD-9-CM   1. Attention deficit hyperactivity disorder, combined type  F90.2 314.01   2. Grief  F43.21 309.0   3. Medication management  Z79.899 V58.69       TREATMENT PLAN/GOALS: Continue supportive psychotherapy efforts and medications as indicated. Treatment and medication options discussed during today's visit. Patient acknowledged and verbally consented to continue with current treatment plan and was educated on the importance of compliance with treatment and follow-up appointments.    MEDICATION ISSUES:    Discussed medication options and treatment plan of prescribed medication as well as the risks, benefits, and side effects including potential falls, possible impaired driving and metabolic adversities among others. Patient is agreeable to call the office with any worsening of symptoms or onset of side effects. Patient is agreeable to call 911 or go to the nearest ER should he/she begin having SI/HI.     MEDS ORDERED DURING VISIT:  New Medications Ordered This Visit   Medications    amphetamine-dextroamphetamine XR (Adderall XR) 15 MG 24 hr capsule     Sig: Take 1 capsule by mouth Daily     Dispense:  30 capsule     Refill:  0     -Continue Adderall XR 15 mg tablet, take 1 tablet every morning.     Return in about 3 months (around 6/7/2025).     I spent 30 minutes caring for Ben on this date of service. This time includes time spent by me in the following activities: preparing for the visit, performing a medically appropriate examination and/or evaluation, counseling and educating the patient/family/caregiver,  documenting information in the medical record, and ordering medications           This document has been electronically signed by PRUDENCIO Rodriguez  March 7, 2025 09:08 EST    Part of this note may be an electronic transcription/translation of spoken language to printed text using the Dragon Dictation System.

## 2025-03-07 ENCOUNTER — TELEMEDICINE (OUTPATIENT)
Dept: PSYCHIATRY | Facility: CLINIC | Age: 30
End: 2025-03-07
Payer: COMMERCIAL

## 2025-03-07 DIAGNOSIS — F43.21 GRIEF: ICD-10-CM

## 2025-03-07 DIAGNOSIS — Z79.899 MEDICATION MANAGEMENT: ICD-10-CM

## 2025-03-07 DIAGNOSIS — F90.2 ATTENTION DEFICIT HYPERACTIVITY DISORDER, COMBINED TYPE: Primary | ICD-10-CM

## 2025-03-07 RX ORDER — DEXTROAMPHETAMINE SACCHARATE, AMPHETAMINE ASPARTATE MONOHYDRATE, DEXTROAMPHETAMINE SULFATE AND AMPHETAMINE SULFATE 3.75; 3.75; 3.75; 3.75 MG/1; MG/1; MG/1; MG/1
15 CAPSULE, EXTENDED RELEASE ORAL DAILY
Qty: 30 CAPSULE | Refills: 0 | Status: SHIPPED | OUTPATIENT
Start: 2025-03-07 | End: 2026-03-07

## 2025-04-16 DIAGNOSIS — F90.2 ATTENTION DEFICIT HYPERACTIVITY DISORDER, COMBINED TYPE: ICD-10-CM

## 2025-04-16 RX ORDER — DEXTROAMPHETAMINE SACCHARATE, AMPHETAMINE ASPARTATE MONOHYDRATE, DEXTROAMPHETAMINE SULFATE AND AMPHETAMINE SULFATE 3.75; 3.75; 3.75; 3.75 MG/1; MG/1; MG/1; MG/1
15 CAPSULE, EXTENDED RELEASE ORAL DAILY
Qty: 30 CAPSULE | Refills: 0 | Status: SHIPPED | OUTPATIENT
Start: 2025-04-16 | End: 2026-04-16

## 2025-06-04 NOTE — PROGRESS NOTES
This provider is located at the [facility] ([facility]), [address], [facility], [address] using a secure [employer] Video Visit through [employer]. Patient is being seen remotely via telehealth at their home address in [place], and stated they are in a secure environment for this session. The patient's condition being diagnosed/treated is appropriate for telemedicine. The provider identified herself as well as her credentials.   The patient, and/or patients guardian, consent to be seen remotely, and when consent is given they understand that the consent allows for patient identifiable information to be sent to a third party as needed.   They may refuse to be seen remotely at any time. The electronic data is encrypted and password protected, and the patient and/or guardian has been advised of the potential risks to privacy not withstanding such measures.    Mode of Visit: Video  Location of patient: -HOME-  Location of provider: +[facility]+  You have chosen to receive care through a telehealth visit.  The patient has signed the video visit consent form.  The visit included audio and video interaction. No technical issues occurred during this visit.      Dez Florez is a 30 y.o. male who presents today for follow up    Chief Complaint: ADHD    History of Present Illness:   History of Present Illness   History of Present Illness  The patient is a 30-year-old male who presents via virtual visit for ADHD.    He reports that his symptoms of ADHD are currently at a level of 2 to 3 on a scale of 10. His sleep pattern is generally consistent, with an average of 6 hours per night, although he experiences occasional difficulty initiating sleep due to intrusive thoughts. He also reports intermittent nightmares. He is not experiencing any auditory or visual hallucinations. He does not consume alcohol, illicit substances, or marijuana. He also does not have any ideation  related to self-harm or harm towards others. He is currently employed at [employer], working the first shift from Monday to Thursday. He is on Adderall XR 15 mg capsule once a day.    Social History:  - Employed at [employer]  - Working first shift from Monday to Thursday  - Expecting another child in September    Substance Use: He does not consume alcohol, illicit substances, or marijuana.    Pertinent Negatives: He is not experiencing any auditory or visual hallucinations. He does not have any ideation related to self-harm or harm towards others.    SOCIAL HISTORY  He does not use alcohol, drugs, or marijuana. He is currently employed at [employer], working the first shift from Monday to Thursday.    The following portions of the patient's history were reviewed and updated as appropriate: allergies, current medications, past family history, past medical history, past social history, past surgical history and problem list.      Past Medical History:  Past Medical History:   Diagnosis Date    ADHD (attention deficit hyperactivity disorder)     Guillain-[place] syndrome        Social History:  Social History     Socioeconomic History    Marital status:    Tobacco Use    Smoking status: Never    Smokeless tobacco: Never   Substance and Sexual Activity    Alcohol use: No    Drug use: No       Family History:  Family History   Problem Relation Age of Onset    ADD / ADHD Father     Anxiety disorder Father     Depression Father     Anxiety disorder Maternal Grandfather     Depression Maternal Grandfather        Past Surgical History:  History reviewed. No pertinent surgical history.    Problem List:  There is no problem list on file for this patient.       Allergy:   Allergies   Allergen Reactions    Influenza Vaccine Live     Meningococcal And Conjugated Vaccines         Current Medications:   Current Outpatient Medications   Medication Sig Dispense Refill     amphetamine-dextroamphetamine XR (Adderall XR) 15 MG 24 hr capsule Take 1 capsule by mouth Daily 30 capsule 0    escitalopram (LEXAPRO) 10 MG tablet Take 1 tablet by mouth Daily. (Patient not taking: Reported on 3/7/2025)      hydrOXYzine (ATARAX) 25 MG tablet Take 1 tablet by mouth Every Night. (Patient not taking: Reported on 3/7/2025)       No current facility-administered medications for this visit.       Review of Symptoms:    Review of Systems   Psychiatric/Behavioral:  Positive for decreased concentration and stress. Negative for dysphoric mood, hallucinations, self-injury, sleep disturbance, suicidal ideas and depressed mood. The patient is not nervous/anxious.    All other systems reviewed and are negative.        Physical Exam:   There were no vitals taken for this visit.  There is no height or weight on file to calculate BMI.  No height and weight on file for this encounter.    06/06/25  MENTAL STATUS EXAM   General Appearance:  Cleanly groomed and dressed  Eye Contact:  Good eye contact  Attitude:  Cooperative  Motor Activity:  Normal gait, posture  Speech:  Normal rate, tone, volume  Language:  Spontaneous  Mood and affect:  Normal, pleasant  Hopelessness:  Denies  Thought Process:  Logical  Associations/ Thought Content:  No delusions  Hallucinations:  None  Suicidal Ideations:  Not present  Homicidal Ideation:  Not present  Sensorium:  Alert  Orientation:  Person, place, time and situation  Insight:  Fair  Judgement:  Fair  Reliability:  Fair  Impulse Control:  Fair      PHQ-Score Total:  PHQ-9 Total Score: (Patient-Rptd) 0      Lab Results:   No visits with results within 1 Month(s) from this visit.   Latest known visit with results is:   Office Visit on 02/28/2022   Component Date Value Ref Range Status    External Amphetamine Screen Urine 02/28/2022 Negative   Final    Amphetamine Cut-Off 02/28/2022 1000ng/ml   Final    External Benzodiazepine Screen Uri* 02/28/2022 Negative   Final     Benzodiazipine Cut-Off 02/28/2022 300ng/ml   Final    External Cocaine Screen Urine 02/28/2022 Negative   Final    Cocaine Cut-Off 02/28/2022 300ng/ml   Final    External THC Screen Urine 02/28/2022 Negative   Final    THC Cut-Off 02/28/2022 50ng/ml   Final    External Methadone Screen Urine 02/28/2022 Negative   Final    Methadone Cut-Off 02/28/2022 300ng/ml   Final    External Methamphetamine Screen Ur* 02/28/2022 Negative   Final    Methamphetamine Cut-Off 02/28/2022 1000ng/ml   Final    External Oxycodone Screen Urine 02/28/2022 Negative   Final    Oxycodone Cut-Off 02/28/2022 100ng/ml   Final    External Buprenorphine Screen Urine 02/28/2022 Negative   Final    Buprenorphine Cut-Off 02/28/2022 10ng/ml   Final    External MDMA 02/28/2022 Negative   Final    MDMA Cut-Off 02/28/2022 500ng/mln   Final    External Opiates Screen Urine 02/28/2022 Negative   Final    Opiates Cut-Off 02/28/2022 300ng/ml   Final     Results      Assessment & Plan   Problems Addressed this Visit    None  Visit Diagnoses         Attention deficit hyperactivity disorder, combined type    -  Primary    Relevant Medications    amphetamine-dextroamphetamine XR (Adderall XR) 15 MG 24 hr capsule      Grief          Medication management              Diagnoses         Codes Comments      Attention deficit hyperactivity disorder, combined type    -  Primary ICD-10-CM: F90.2  ICD-9-CM: 314.01       Grief     ICD-10-CM: F43.21  ICD-9-CM: 309.0       Medication management     ICD-10-CM: Z79.899  ICD-9-CM: V58.69           Assessment & Plan  Problems:  - Attention Deficit Hyperactivity Disorder (ADHD)    Content of Therapy:  During the session, the patient discussed his current symptoms of ADHD, which he rated as 2-3 out of 10. He also reported his sleep patterns, noting that he sleeps approximately 6 hours per night but occasionally experiences nightmares. The patient confirmed that he does not experience hallucinations and does not use alcohol,  drugs, or marijuana. Additionally, he stated that he has no thoughts of self-harm or harming others. The session included a discussion about his work schedule and upcoming family changes, such as expecting another child in September.    Clinical Impression:  The patient's ADHD symptoms appear to be stable, with a low severity rating of 2-3 out of 10. His sleep is somewhat disrupted by occasional nightmares, but he generally gets around 6 hours of sleep per night. There are no indications of hallucinations or substance use, and he has no thoughts of self-harm or harming others. Overall, his mental health seems stable, and he is responding well to his current treatment regimen.    Therapeutic Intervention:  The session involved monitoring and assessing the patient's ADHD symptoms and overall mental health. The clinician provided support and reassurance regarding his upcoming family changes and work schedule. A prescription refill for Adderall XR 15 mg capsule once daily was issued.    Plan:  - Refill Adderall XR 15 mg capsule once daily.  - Instructed to stop by the office and complete a urine drug screen prior to his next visit    Follow-up:  The patient will follow up on 08/29/2025 at 8:00 AM.           Social History     Tobacco Use   Smoking Status Never   Smokeless Tobacco Never       JERRY reviewed and appropriate. Patient counseled on use of controlled substances.     -The benefits of a healthy diet and exercise were discussed with patient, especially the positive effects they have on mental health. Patient encouraged to consider lifestyle modification regarding  diet and exercise patterns to maximize results of mental health treatment.  -Reviewed previous available documentation  -Reviewed most recent available labs     -The patient is being prescribed a controlled substance as part of the treatment plan. The patient has been educated of appropriate use of the medication, including risks and side effects such  as somnolence, limited ability to drive and/or work or function safely, potential for dependence, respiratory depression, falls, change in blood pressure, changes in heart rhythm or heart rate, activation of other mental illnesses, and overdose among others. Patient is also informed that the medication is to be used by the patient only, and to avoid any combined use of ETOH, or other substances, with this medication unless prescribed and as directed by a Provider.  The patient verbalized understanding and agreement with this in their own words.     -Prior medications include Lexapro, Hydroxyzine, Concerta, and Vyvanse, (didn't tolerate, caused aggression)    Visit Diagnoses:    ICD-10-CM ICD-9-CM   1. Attention deficit hyperactivity disorder, combined type  F90.2 314.01   2. Grief  F43.21 309.0   3. Medication management  Z79.899 V58.69       TREATMENT PLAN/GOALS: Continue supportive psychotherapy efforts and medications as indicated. Treatment and medication options discussed during today's visit. Patient acknowledged and verbally consented to continue with current treatment plan and was educated on the importance of compliance with treatment and follow-up appointments.    MEDICATION ISSUES:    Discussed medication options and treatment plan of prescribed medication as well as the risks, benefits, and side effects including potential falls, possible impaired driving and metabolic adversities among others. Patient is agreeable to call the office with any worsening of symptoms or onset of side effects. Patient is agreeable to call 911 or go to the nearest ER should he/she begin having SI/HI.     MEDS ORDERED DURING VISIT:  New Medications Ordered This Visit   Medications    amphetamine-dextroamphetamine XR (Adderall XR) 15 MG 24 hr capsule     Sig: Take 1 capsule by mouth Daily     Dispense:  30 capsule     Refill:  0     -Continue Adderall XR 15 mg tablet, take 1 tablet every morning.   -Instructed to stop by the  office and complete a urine drug screen prior to his next visit    Return in about 3 months (around 9/6/2025).       I spent 30 minutes caring for Ben on this date of service. This time includes time spent by me in the following activities: preparing for the visit, performing a medically appropriate examination and/or evaluation, counseling and educating the patient/family/caregiver, documenting information in the medical record, and ordering medications    I have personally reviewed this patients note, confirmed and/or updated, this visit as appropriate. History of present illness- interval history, physical examination, assessment and plan, allergies, current medications, past family history, past medical history, past social history, past surgical history and problem list.          This document has been electronically signed by PRUDENCIO Rodriguez  June 6, 2025 08:50 EDT    Patient or patient representative verbalized consent for the use of Ambient List[employer] during the visit with  PRUDENCIO Rodriguez for chart documentation. 6/6/2025  08:34 EDT    Part of this note may be an electronic transcription/translation of spoken language to printed text using the Dragon Dictation System.

## 2025-06-06 ENCOUNTER — TELEMEDICINE (OUTPATIENT)
Dept: PSYCHIATRY | Facility: CLINIC | Age: 30
End: 2025-06-06
Payer: COMMERCIAL

## 2025-06-06 DIAGNOSIS — Z79.899 MEDICATION MANAGEMENT: ICD-10-CM

## 2025-06-06 DIAGNOSIS — F90.2 ATTENTION DEFICIT HYPERACTIVITY DISORDER, COMBINED TYPE: Primary | ICD-10-CM

## 2025-06-06 DIAGNOSIS — F43.21 GRIEF: ICD-10-CM

## 2025-06-06 RX ORDER — DEXTROAMPHETAMINE SACCHARATE, AMPHETAMINE ASPARTATE MONOHYDRATE, DEXTROAMPHETAMINE SULFATE AND AMPHETAMINE SULFATE 3.75; 3.75; 3.75; 3.75 MG/1; MG/1; MG/1; MG/1
15 CAPSULE, EXTENDED RELEASE ORAL DAILY
Qty: 30 CAPSULE | Refills: 0 | Status: SHIPPED | OUTPATIENT
Start: 2025-06-06 | End: 2026-06-06

## 2025-07-25 DIAGNOSIS — F90.2 ATTENTION DEFICIT HYPERACTIVITY DISORDER, COMBINED TYPE: ICD-10-CM

## 2025-07-25 RX ORDER — DEXTROAMPHETAMINE SACCHARATE, AMPHETAMINE ASPARTATE MONOHYDRATE, DEXTROAMPHETAMINE SULFATE AND AMPHETAMINE SULFATE 3.75; 3.75; 3.75; 3.75 MG/1; MG/1; MG/1; MG/1
15 CAPSULE, EXTENDED RELEASE ORAL DAILY
Qty: 30 CAPSULE | Refills: 0 | Status: SHIPPED | OUTPATIENT
Start: 2025-07-25 | End: 2026-07-25

## 2025-08-28 DIAGNOSIS — Z79.899 MEDICATION MANAGEMENT: Primary | ICD-10-CM

## 2025-08-28 LAB
AMPHET+METHAMPHET UR QL: NEGATIVE
AMPHETAMINES UR QL: NEGATIVE
BARBITURATES UR QL SCN: NEGATIVE
BENZODIAZ UR QL SCN: NEGATIVE
BUPRENORPHINE SERPL-MCNC: NEGATIVE NG/ML
CANNABINOIDS SERPL QL: NEGATIVE
COCAINE UR QL: NEGATIVE
MDMA UR QL SCN: NEGATIVE
METHADONE UR QL SCN: NEGATIVE
MORPHINE/OPIATES SCREEN, URINE: NEGATIVE
OXYCODONE UR QL SCN: NEGATIVE
PCP UR QL SCN: NEGATIVE
PROPOXYPH UR QL SCN: NEGATIVE
TRICYCLICS UR QL SCN: NEGATIVE

## 2025-08-29 ENCOUNTER — TELEMEDICINE (OUTPATIENT)
Dept: PSYCHIATRY | Facility: CLINIC | Age: 30
End: 2025-08-29
Payer: COMMERCIAL

## 2025-08-29 DIAGNOSIS — F43.21 GRIEF: ICD-10-CM

## 2025-08-29 DIAGNOSIS — Z79.899 MEDICATION MANAGEMENT: ICD-10-CM

## 2025-08-29 DIAGNOSIS — F90.2 ATTENTION DEFICIT HYPERACTIVITY DISORDER, COMBINED TYPE: Primary | ICD-10-CM

## 2025-08-29 RX ORDER — DEXTROAMPHETAMINE SACCHARATE, AMPHETAMINE ASPARTATE MONOHYDRATE, DEXTROAMPHETAMINE SULFATE AND AMPHETAMINE SULFATE 5; 5; 5; 5 MG/1; MG/1; MG/1; MG/1
20 CAPSULE, EXTENDED RELEASE ORAL DAILY
Qty: 30 CAPSULE | Refills: 0 | Status: SHIPPED | OUTPATIENT
Start: 2025-08-29 | End: 2026-08-29